# Patient Record
Sex: MALE | Race: WHITE | Employment: UNEMPLOYED | URBAN - METROPOLITAN AREA
[De-identification: names, ages, dates, MRNs, and addresses within clinical notes are randomized per-mention and may not be internally consistent; named-entity substitution may affect disease eponyms.]

---

## 2020-02-24 ENCOUNTER — APPOINTMENT (OUTPATIENT)
Dept: GENERAL RADIOLOGY | Age: 45
End: 2020-02-24
Payer: COMMERCIAL

## 2020-02-24 ENCOUNTER — HOSPITAL ENCOUNTER (EMERGENCY)
Age: 45
Discharge: HOME | End: 2020-02-24
Payer: COMMERCIAL

## 2020-02-24 VITALS
WEIGHT: 194.22 LBS | RESPIRATION RATE: 16 BRPM | TEMPERATURE: 98.2 F | SYSTOLIC BLOOD PRESSURE: 173 MMHG | DIASTOLIC BLOOD PRESSURE: 95 MMHG | HEART RATE: 73 BPM | OXYGEN SATURATION: 99 %

## 2020-02-24 PROCEDURE — 29125 APPL SHORT ARM SPLINT STATIC: CPT

## 2020-02-24 PROCEDURE — 6370000000 HC RX 637 (ALT 250 FOR IP): Performed by: PHYSICIAN ASSISTANT

## 2020-02-24 PROCEDURE — 99283 EMERGENCY DEPT VISIT LOW MDM: CPT

## 2020-02-24 PROCEDURE — 73110 X-RAY EXAM OF WRIST: CPT

## 2020-02-24 RX ORDER — OXYCODONE HYDROCHLORIDE AND ACETAMINOPHEN 5; 325 MG/1; MG/1
1 TABLET ORAL EVERY 8 HOURS PRN
Qty: 10 TABLET | Refills: 0 | Status: SHIPPED | OUTPATIENT
Start: 2020-02-24 | End: 2020-02-29

## 2020-02-24 RX ORDER — IBUPROFEN 800 MG/1
800 TABLET ORAL EVERY 6 HOURS PRN
Qty: 30 TABLET | Refills: 0 | Status: SHIPPED | OUTPATIENT
Start: 2020-02-24 | End: 2021-07-15 | Stop reason: ALTCHOICE

## 2020-02-24 RX ORDER — OXYCODONE HYDROCHLORIDE AND ACETAMINOPHEN 5; 325 MG/1; MG/1
2 TABLET ORAL ONCE
Status: COMPLETED | OUTPATIENT
Start: 2020-02-24 | End: 2020-02-24

## 2020-02-24 RX ADMIN — OXYCODONE HYDROCHLORIDE AND ACETAMINOPHEN 2 TABLET: 5; 325 TABLET ORAL at 16:30

## 2020-02-24 SDOH — HEALTH STABILITY: MENTAL HEALTH: HOW OFTEN DO YOU HAVE A DRINK CONTAINING ALCOHOL?: NEVER

## 2020-02-24 ASSESSMENT — PAIN DESCRIPTION - PROGRESSION: CLINICAL_PROGRESSION: NOT CHANGED

## 2020-02-24 ASSESSMENT — PAIN SCALES - GENERAL
PAINLEVEL_OUTOF10: 5

## 2020-02-24 ASSESSMENT — PAIN DESCRIPTION - DESCRIPTORS: DESCRIPTORS: ACHING

## 2020-02-24 ASSESSMENT — PAIN DESCRIPTION - PAIN TYPE: TYPE: ACUTE PAIN

## 2020-02-24 ASSESSMENT — PAIN DESCRIPTION - LOCATION: LOCATION: WRIST

## 2020-02-24 ASSESSMENT — PAIN - FUNCTIONAL ASSESSMENT
PAIN_FUNCTIONAL_ASSESSMENT: PREVENTS OR INTERFERES SOME ACTIVE ACTIVITIES AND ADLS
PAIN_FUNCTIONAL_ASSESSMENT: 0-10

## 2020-02-24 ASSESSMENT — PAIN DESCRIPTION - ORIENTATION: ORIENTATION: RIGHT

## 2020-02-24 ASSESSMENT — PAIN DESCRIPTION - FREQUENCY: FREQUENCY: INTERMITTENT

## 2020-02-24 ASSESSMENT — PAIN DESCRIPTION - ONSET: ONSET: ON-GOING

## 2020-02-24 NOTE — LETTER
[address] [facility]  [address] [address]  Phone: [phone]               February 24, 2020    Patient:    Date of Birth:    Date of Visit:        To Whom It May Concern:    Vaibhav Wayalgo was present  in our emergency department on 2/24/2020.      Sincerely,       Catherine Oliver RN         Signature:__________________________________

## 2020-02-24 NOTE — ED PROVIDER NOTES
[address]  [address] [address]  Dept: [phone]  Loc: [address] ENCOUNTER        This patient was not seen or evaluated by the attending physician. I evaluated this patient, the attending physician was available for consultation. CHIEF COMPLAINT    Chief Complaint   Patient presents with    Wrist Pain     right wrist pain       HPI    Nikkie Mott is a 38 y.o. male who presents with right wrist pain. The onset was last night. The duration has been constant since the onset. The quality of the pain is sharp and the severity is 5/10. The patient has no other associated injury. The context is that the patient was \"playing around\" last night and tripped over the edge of the bed and fell backwards, catching himself with his right hand. He states he had some pain and swelling at the time but it was worse this morning so he came to the ED. REVIEW OF SYSTEMS    Skin: No lacerations or puncture wounds  Musculoskeletal: see HPI, no other joint or bony injury or pain  Neurologic: No loss of consciousness, no head injury, no paresthesias or focal distal extremity weakness    PAST MEDICAL & SURGICAL HISTORY    History reviewed. No pertinent past medical history. History reviewed. No pertinent surgical history. CURRENT MEDICATIONS  (may include discharge medications prescribed in the ED)  Current Outpatient Rx   Medication Sig Dispense Refill    oxyCODONE-acetaminophen (PERCOCET) 5-325 MG per tablet One tab twice a day for one week, then one tab once a day for one week then stop.  21 tablet 0       ALLERGIES    Allergies   Allergen Reactions    Hydrocodone        SOCIAL & FAMILY HISTORY    Social History     Socioeconomic History    Marital status: Single     Spouse name: None    Number of children: None    Years of education: None    Highest education level: None   Occupational History  None   Social Needs    Financial resource strain: None    Food insecurity:     Worry: None     Inability: None    Transportation needs:     Medical: None     Non-medical: None   Tobacco Use    Smoking status: Former Smoker    Smokeless tobacco: Never Used   Substance and Sexual Activity    Alcohol use: Never     Frequency: Never    Drug use: Never    Sexual activity: None   Lifestyle    Physical activity:     Days per week: None     Minutes per session: None    Stress: None   Relationships    Social connections:     Talks on phone: None     Gets together: None     Attends Mu-ism service: None     Active member of club or organization: None     Attends meetings of clubs or organizations: None     Relationship status: None    Intimate partner violence:     Fear of current or ex partner: None     Emotionally abused: None     Physically abused: None     Forced sexual activity: None   Other Topics Concern    None   Social History Narrative    None     History reviewed. No pertinent family history.       PHYSICAL EXAM    VITAL SIGNS: BP (!) 173/95   Pulse 73   Temp 98.2 °F (36.8 °C) (Oral)   Resp 16   Wt 194 lb 3.6 oz (88.1 kg)   SpO2 99%   Constitutional:  Well nourished, no acute distress  HENT:  Atraumatic, moist mucous membranes  NECK: normal range of motion,  supple   Respiratory:  No respiratory distress  Cardiovascular:  No JVD  Vascular: right radial pulse 2+, capillary refill less than 2 seconds  Musculoskeletal:  +exquisite tenderness to palpation and edema of the wrist, point tenderness over the distal radius, edema extends up to the middle of the forearm, no obvious deformity  Integument:  Well hydrated, no skin lacerations  Neurologic:  Awake alert, no slurred speech, sensory and motor intact    RADIOLOGY   XR WRIST RIGHT (MIN 3 VIEWS)   Final Result   Acute fracture of the distal radius with involvement of the articular surface             PROCEDURES  SPLINT PLACEMENT  A volar OCL splint was applied to the affected limb by the emergency department technician. I evaluated the splint after it was applied. The splint was in good position. The patient's extremity was neurovascularly intact after the splint placement. ED COURSE & MEDICAL DECISION MAKING   See chart for medications given during emergency department course. Differential diagnosis: includes but not limited to Arterial Injury/Ischemia, Fracture, Dislocation, Infection, Compartment Syndrome, Neurologic Deficit/Injury. No evidence of neurovascular injury on exam.  Plain films as above, distal radius fracture. Consultation with orthopedics at 4:20 PM: I spoke with Isma Gonzalez CNP for Dr. Cabrera Braden, she said the patient can follow up in the clinic with Dr. Cabrera Braden in [place] tomorrow at 1 PM.    The patient was instructed to follow up as an outpatient in 1 day. The patient was instructed to return to the ED immediately for any new or worsening symptoms. The patient verbalized understanding. FINAL IMPRESSION    1.  Other closed fracture of distal end of right radius, initial encounter        PLAN  Discharge with outpatient follow-up and discharge instructions (see EMR)    (Please note that this note was completed with a voice recognition program.  Every attempt was made to edit the dictations, but inevitably there remain words that are mis-transcribed.)          Jaime Turner [facility]  02/24/20 9001

## 2020-02-25 ENCOUNTER — OFFICE VISIT (OUTPATIENT)
Dept: ORTHOPEDIC SURGERY | Age: 45
End: 2020-02-25
Payer: COMMERCIAL

## 2020-02-25 VITALS — RESPIRATION RATE: 16 BRPM | HEIGHT: 70 IN | WEIGHT: 194 LBS | BODY MASS INDEX: 27.77 KG/M2

## 2020-02-25 PROBLEM — S52.501A CLOSED FRACTURE OF RIGHT DISTAL RADIUS: Status: ACTIVE | Noted: 2020-02-25

## 2020-02-25 PROCEDURE — 99203 OFFICE O/P NEW LOW 30 MIN: CPT | Performed by: ORTHOPAEDIC SURGERY

## 2020-02-25 PROCEDURE — G8484 FLU IMMUNIZE NO ADMIN: HCPCS | Performed by: ORTHOPAEDIC SURGERY

## 2020-02-25 PROCEDURE — G8427 DOCREV CUR MEDS BY ELIG CLIN: HCPCS | Performed by: ORTHOPAEDIC SURGERY

## 2020-02-25 PROCEDURE — G8419 CALC BMI OUT NRM PARAM NOF/U: HCPCS | Performed by: ORTHOPAEDIC SURGERY

## 2020-02-25 PROCEDURE — 1036F TOBACCO NON-USER: CPT | Performed by: ORTHOPAEDIC SURGERY

## 2020-02-25 RX ORDER — OXYCODONE HYDROCHLORIDE AND ACETAMINOPHEN 5; 325 MG/1; MG/1
1 TABLET ORAL EVERY 8 HOURS PRN
Qty: 15 TABLET | Refills: 0 | Status: SHIPPED | OUTPATIENT
Start: 2020-02-25 | End: 2020-03-01

## 2020-02-25 RX ORDER — CEPHALEXIN 500 MG/1
500 CAPSULE ORAL 4 TIMES DAILY
Qty: 20 CAPSULE | Refills: 0 | Status: SHIPPED | OUTPATIENT
Start: 2020-02-25 | End: 2021-07-15 | Stop reason: ALTCHOICE

## 2020-02-25 NOTE — LETTER
[facility]  [address] [place] [address]  Phone: [phone]  Fax: [phone]    Armando Garcia MD        February 25, 2020     Patient: Gaby Whitlock   YOB: 1975   Date of Visit: 2/25/2020       To Whom it May Concern:    Please excuse Lizzie Peace from work on 02/25/2020-02/27/2020 for she is accompanying her  for his orthopedic surgery. If you have any questions or concerns, please don't hesitate to call.     Sincerely,     Armando Garcia MD

## 2020-02-25 NOTE — PROGRESS NOTES
CHIEF COMPLAINT: Right wrist pain/ minimally displaced impacted distal radius intra-articular fracture. DATE OF INJURY: 2/23/2020    HISTORY:  Mr. Romeo Ocampo is a 38 y.o.  male right handed who presents today for evaluation of a right wrist injury. The patient reports that this injury occurred when he was playing around and fell backwards trying to catch himself on the bed but landed on the floor. He was first seen and evaluated in [facility] ER, when he was x-rayed and splinted, and asked to f/u with Orthopedics. The patient denies any other injuries. Rates pain a 8/10 VAS severe, sharp, aching, constant and show no change. Movement makes the pain worse, the splint and resting makes the pain better. Alleviating factors ice, elevation and rest. No numbness or tingling sensation. He works in a warehouse doing carpet and hanny, physical labor. He is a smoker. History reviewed. No pertinent past medical history. History reviewed. No pertinent surgical history.     Social History     Socioeconomic History    Marital status: Single     Spouse name: Not on file    Number of children: Not on file    Years of education: Not on file    Highest education level: Not on file   Occupational History    Not on file   Social Needs    Financial resource strain: Not on file    Food insecurity:     Worry: Not on file     Inability: Not on file    Transportation needs:     Medical: Not on file     Non-medical: Not on file   Tobacco Use    Smoking status: Former Smoker    Smokeless tobacco: Never Used   Substance and Sexual Activity    Alcohol use: Never     Frequency: Never    Drug use: Never    Sexual activity: Not on file   Lifestyle    Physical activity:     Days per week: Not on file     Minutes per session: Not on file    Stress: Not on file   Relationships    Social connections:     Talks on phone: Not on file     Gets together: Not on file     Attends Sikh service: Not on file     Active member of club or organization: Not on file     Attends meetings of clubs or organizations: Not on file     Relationship status: Not on file    Intimate partner violence:     Fear of current or ex partner: Not on file     Emotionally abused: Not on file     Physically abused: Not on file     Forced sexual activity: Not on file   Other Topics Concern    Not on file   Social History Narrative    Not on file       History reviewed. No pertinent family history. Current Outpatient Medications on File Prior to Visit   Medication Sig Dispense Refill    oxyCODONE-acetaminophen (PERCOCET) 5-325 MG per tablet Take 1 tablet by mouth every 8 hours as needed for Pain for up to 5 days. WARNING:  May cause drowsiness. May impair ability to operate vehicles or machinery. Do not use in combination with alcohol. 10 tablet 0    ibuprofen (ADVIL;MOTRIN) 800 MG tablet Take 1 tablet by mouth every 6 hours as needed for Pain 30 tablet 0     No current facility-administered medications on file prior to visit. Pertinent items are noted in HPI  Review of systems reviewed from Patient History Form dated on 2/25/2020 and available in the patient's chart under the Media tab. PHYSICAL EXAMINATION:  Mr. Alonzo Briones is a very pleasant 38 y.o.  male who presents today in no acute distress, awake, alert, and oriented. He is well dressed, nourished and  groomed. Patient with normal affect. Height is  5' 10\" (1.778 m), weight is 194 lb (88 kg), Body mass index is 27.84 kg/m². Resting respiratory rate is 16. On evaluation of his right upper extremity, there is no deformity. There is moderate swelling and moderate ecchymosis. He is tender to palpation over the distal radius, and otherwise nontender over the remainder of the extremity. Range of motion is decreased secondary to pain over the right wrist, but no mechanical block.   The skin overlying the right wrist is intact without evidence of lesion, laceration or

## 2020-02-26 NOTE — PROGRESS NOTES
Name_______________________________________Printed:____________________  Date and time of surgery________2/27/20 0700________________Arrival Time:_____[facility] [place]___   1. The instructions given regarding when and if a patient needs to stop oral intake prior to surgery varies. Follow the specific instructions you were given                  __x_Nothing to eat or to drink after Midnight the night before.                             ____Endoscopy patient follow your DRS instructions-generally you will be doing a part of the prep after Midnight                   ____Carbo loading or ERAS instructions will be given to select patients-if you have been given those instructions -please do the following                           The evening before your surgery after dinner before midnight drink 40 ounces of gatorade. If you are diabetic use sugar free. The morning of surgery drink 40 ounces of water. This needs to be finished 3 hours prior to your surgery start time. 2. Take the following pills with a small sip of water on the morning of surgery___________________________________________________                  Do not take blood pressure medications ending in pril or sartan the domonique prior to surgery or the morning of surgery_   3. Aspirin, Ibuprofen, Advil, Naproxen, Vitamin E and other Anti-inflammatory products and supplements should be stopped for 5 -7days before surgery or as directed by your physician. 4. Check with your Doctor regarding stopping Plavix, Coumadin,Eliquis, Lovenox,Effient,Pradaxa,Xarelto, Fragmin or other blood thinners and follow their instructions. 5. Do not smoke, and do not drink any alcoholic beverages 24 hours prior to surgery. This includes NA Beer. Refrain from the usage of any recreational drugs. 6. You may brush your teeth and gargle the morning of surgery. DO NOT SWALLOW WATER   7.  You MUST make arrangements for a responsible adult to stay on site while you are here and take you home after your surgery. You will not be allowed to leave alone or drive yourself home. It is strongly suggested someone stay with you the first 24 hrs. Your surgery will be cancelled if you do not have a ride home. 8. A parent/legal guardian must accompany a child scheduled for surgery and plan to stay at the hospital until the child is discharged. Please do not bring other children with you. 9. Please wear simple, loose fitting clothing to the hospital.  Aaliyah Crofts not bring valuables (money, credit cards, checkbooks, etc.) Do not wear any makeup (including no eye makeup) or nail polish on your fingers or toes. 10. DO NOT wear any jewelry or piercings on day of surgery. All body piercing jewelry must be removed. 11. If you have ___dentures, they will be removed before going to the OR; we will provide you a container. If you wear ___contact lenses or ___glasses, they will be removed; please bring a case for them. 12. Please see your family doctor/pediatrician for a history & physical and/or concerning medications. Bring any test results/reports from your physician's office. PCP________x__________Phone___________H&P Appt. Date________             13 If you  have a Living Will and Durable Power of  for Healthcare, please bring in a copy. 15. Notify your Surgeon if you develop any illness between now and surgery  time, cough, cold, fever, sore throat, nausea, vomiting, etc.  Please notify your surgeon if you experience dizziness, shortness of breath or blurred vision between now & the time of your surgery             15. DO NOT shave your operative site 96 hours prior to surgery. For face & neck surgery, men may use an electric razor 48 hours prior to surgery. 16. Shower the night before or morning of surgery using an antibacterial soap or as you have been instructed.              17. To provide excellent care visitors will be limited to one in the

## 2020-02-27 ENCOUNTER — APPOINTMENT (OUTPATIENT)
Dept: GENERAL RADIOLOGY | Age: 45
End: 2020-02-27
Attending: ORTHOPAEDIC SURGERY
Payer: COMMERCIAL

## 2020-02-27 ENCOUNTER — ANESTHESIA EVENT (OUTPATIENT)
Dept: OPERATING ROOM | Age: 45
End: 2020-02-27
Payer: COMMERCIAL

## 2020-02-27 ENCOUNTER — HOSPITAL ENCOUNTER (OUTPATIENT)
Age: 45
Setting detail: OUTPATIENT SURGERY
Discharge: HOME | End: 2020-02-27
Attending: ORTHOPAEDIC SURGERY | Admitting: ORTHOPAEDIC SURGERY
Payer: COMMERCIAL

## 2020-02-27 ENCOUNTER — ANESTHESIA (OUTPATIENT)
Dept: OPERATING ROOM | Age: 45
End: 2020-02-27
Payer: COMMERCIAL

## 2020-02-27 VITALS
OXYGEN SATURATION: 97 % | TEMPERATURE: 98.3 F | WEIGHT: 190 LBS | BODY MASS INDEX: 27.2 KG/M2 | RESPIRATION RATE: 16 BRPM | HEIGHT: 70 IN | DIASTOLIC BLOOD PRESSURE: 88 MMHG | SYSTOLIC BLOOD PRESSURE: 144 MMHG | HEART RATE: 62 BPM

## 2020-02-27 VITALS
SYSTOLIC BLOOD PRESSURE: 129 MMHG | DIASTOLIC BLOOD PRESSURE: 83 MMHG | OXYGEN SATURATION: 99 % | RESPIRATION RATE: 3 BRPM

## 2020-02-27 PROCEDURE — 7100000001 HC PACU RECOVERY - ADDTL 15 MIN: Performed by: ORTHOPAEDIC SURGERY

## 2020-02-27 PROCEDURE — 2580000003 HC RX 258: Performed by: ORTHOPAEDIC SURGERY

## 2020-02-27 PROCEDURE — 2709999900 HC NON-CHARGEABLE SUPPLY: Performed by: ORTHOPAEDIC SURGERY

## 2020-02-27 PROCEDURE — 7100000010 HC PHASE II RECOVERY - FIRST 15 MIN: Performed by: ORTHOPAEDIC SURGERY

## 2020-02-27 PROCEDURE — 3700000000 HC ANESTHESIA ATTENDED CARE: Performed by: ORTHOPAEDIC SURGERY

## 2020-02-27 PROCEDURE — 3600000014 HC SURGERY LEVEL 4 ADDTL 15MIN: Performed by: ORTHOPAEDIC SURGERY

## 2020-02-27 PROCEDURE — 3209999900 FLUORO FOR SURGICAL PROCEDURES

## 2020-02-27 PROCEDURE — 25609 OPTX DST RD XART FX/EP SEP3+: CPT | Performed by: NURSE PRACTITIONER

## 2020-02-27 PROCEDURE — 6360000002 HC RX W HCPCS: Performed by: NURSE ANESTHETIST, CERTIFIED REGISTERED

## 2020-02-27 PROCEDURE — 6360000002 HC RX W HCPCS: Performed by: ORTHOPAEDIC SURGERY

## 2020-02-27 PROCEDURE — 6370000000 HC RX 637 (ALT 250 FOR IP): Performed by: ANESTHESIOLOGY

## 2020-02-27 PROCEDURE — C1776 JOINT DEVICE (IMPLANTABLE): HCPCS | Performed by: ORTHOPAEDIC SURGERY

## 2020-02-27 PROCEDURE — 7100000011 HC PHASE II RECOVERY - ADDTL 15 MIN: Performed by: ORTHOPAEDIC SURGERY

## 2020-02-27 PROCEDURE — 3700000001 HC ADD 15 MINUTES (ANESTHESIA): Performed by: ORTHOPAEDIC SURGERY

## 2020-02-27 PROCEDURE — 6360000002 HC RX W HCPCS: Performed by: ANESTHESIOLOGY

## 2020-02-27 PROCEDURE — 2500000003 HC RX 250 WO HCPCS: Performed by: ORTHOPAEDIC SURGERY

## 2020-02-27 PROCEDURE — 2720000010 HC SURG SUPPLY STERILE: Performed by: ORTHOPAEDIC SURGERY

## 2020-02-27 PROCEDURE — 25609 OPTX DST RD XART FX/EP SEP3+: CPT | Performed by: ORTHOPAEDIC SURGERY

## 2020-02-27 PROCEDURE — 7100000000 HC PACU RECOVERY - FIRST 15 MIN: Performed by: ORTHOPAEDIC SURGERY

## 2020-02-27 PROCEDURE — 3600000004 HC SURGERY LEVEL 4 BASE: Performed by: ORTHOPAEDIC SURGERY

## 2020-02-27 PROCEDURE — 73100 X-RAY EXAM OF WRIST: CPT

## 2020-02-27 PROCEDURE — C1713 ANCHOR/SCREW BN/BN,TIS/BN: HCPCS | Performed by: ORTHOPAEDIC SURGERY

## 2020-02-27 PROCEDURE — 2500000003 HC RX 250 WO HCPCS: Performed by: NURSE ANESTHETIST, CERTIFIED REGISTERED

## 2020-02-27 DEVICE — LOCKING SCREW, FULLY THREADED,T8
Type: IMPLANTABLE DEVICE | Site: WRIST | Status: FUNCTIONAL
Brand: VARIAX

## 2020-02-27 DEVICE — IMPLANTABLE DEVICE: Type: IMPLANTABLE DEVICE | Site: WRIST | Status: FUNCTIONAL

## 2020-02-27 DEVICE — VOLAR DR PLATE NARROW RIGHT EXTRASHORT
Type: IMPLANTABLE DEVICE | Site: WRIST | Status: FUNCTIONAL
Brand: VARIAX

## 2020-02-27 DEVICE — BONE SCREW, FULLY THREADED, T8
Type: IMPLANTABLE DEVICE | Site: WRIST | Status: FUNCTIONAL
Brand: VARIAX

## 2020-02-27 RX ORDER — SODIUM CHLORIDE 0.9 % (FLUSH) 0.9 %
10 SYRINGE (ML) INJECTION EVERY 12 HOURS SCHEDULED
Status: DISCONTINUED | OUTPATIENT
Start: 2020-02-27 | End: 2020-02-27 | Stop reason: HOSPADM

## 2020-02-27 RX ORDER — PROMETHAZINE HYDROCHLORIDE 25 MG/ML
6.25 INJECTION, SOLUTION INTRAMUSCULAR; INTRAVENOUS
Status: DISCONTINUED | OUTPATIENT
Start: 2020-02-27 | End: 2020-02-27 | Stop reason: HOSPADM

## 2020-02-27 RX ORDER — ONDANSETRON 2 MG/ML
INJECTION INTRAMUSCULAR; INTRAVENOUS PRN
Status: DISCONTINUED | OUTPATIENT
Start: 2020-02-27 | End: 2020-02-27 | Stop reason: SDUPTHER

## 2020-02-27 RX ORDER — PROPOFOL 10 MG/ML
INJECTION, EMULSION INTRAVENOUS PRN
Status: DISCONTINUED | OUTPATIENT
Start: 2020-02-27 | End: 2020-02-27 | Stop reason: SDUPTHER

## 2020-02-27 RX ORDER — PHENYLEPHRINE HCL IN 0.9% NACL 1 MG/10 ML
SYRINGE (ML) INTRAVENOUS PRN
Status: DISCONTINUED | OUTPATIENT
Start: 2020-02-27 | End: 2020-02-27 | Stop reason: SDUPTHER

## 2020-02-27 RX ORDER — HYDROMORPHONE HCL 110MG/55ML
0.5 PATIENT CONTROLLED ANALGESIA SYRINGE INTRAVENOUS EVERY 5 MIN PRN
Status: DISCONTINUED | OUTPATIENT
Start: 2020-02-27 | End: 2020-02-27 | Stop reason: HOSPADM

## 2020-02-27 RX ORDER — LABETALOL HYDROCHLORIDE 5 MG/ML
INJECTION, SOLUTION INTRAVENOUS PRN
Status: DISCONTINUED | OUTPATIENT
Start: 2020-02-27 | End: 2020-02-27 | Stop reason: SDUPTHER

## 2020-02-27 RX ORDER — MIDAZOLAM HYDROCHLORIDE 1 MG/ML
INJECTION INTRAMUSCULAR; INTRAVENOUS PRN
Status: DISCONTINUED | OUTPATIENT
Start: 2020-02-27 | End: 2020-02-27 | Stop reason: SDUPTHER

## 2020-02-27 RX ORDER — OXYCODONE HYDROCHLORIDE AND ACETAMINOPHEN 5; 325 MG/1; MG/1
1 TABLET ORAL ONCE
Status: COMPLETED | OUTPATIENT
Start: 2020-02-27 | End: 2020-02-27

## 2020-02-27 RX ORDER — FENTANYL CITRATE 50 UG/ML
25 INJECTION, SOLUTION INTRAMUSCULAR; INTRAVENOUS EVERY 5 MIN PRN
Status: DISCONTINUED | OUTPATIENT
Start: 2020-02-27 | End: 2020-02-27 | Stop reason: HOSPADM

## 2020-02-27 RX ORDER — LABETALOL HYDROCHLORIDE 5 MG/ML
5 INJECTION, SOLUTION INTRAVENOUS EVERY 10 MIN PRN
Status: DISCONTINUED | OUTPATIENT
Start: 2020-02-27 | End: 2020-02-27 | Stop reason: HOSPADM

## 2020-02-27 RX ORDER — BUPIVACAINE HYDROCHLORIDE 5 MG/ML
INJECTION, SOLUTION EPIDURAL; INTRACAUDAL
Status: COMPLETED | OUTPATIENT
Start: 2020-02-27 | End: 2020-02-27

## 2020-02-27 RX ORDER — DEXAMETHASONE SODIUM PHOSPHATE 4 MG/ML
INJECTION, SOLUTION INTRA-ARTICULAR; INTRALESIONAL; INTRAMUSCULAR; INTRAVENOUS; SOFT TISSUE PRN
Status: DISCONTINUED | OUTPATIENT
Start: 2020-02-27 | End: 2020-02-27 | Stop reason: SDUPTHER

## 2020-02-27 RX ORDER — SODIUM CHLORIDE 0.9 % (FLUSH) 0.9 %
10 SYRINGE (ML) INJECTION PRN
Status: DISCONTINUED | OUTPATIENT
Start: 2020-02-27 | End: 2020-02-27 | Stop reason: HOSPADM

## 2020-02-27 RX ORDER — SODIUM CHLORIDE, SODIUM LACTATE, POTASSIUM CHLORIDE, CALCIUM CHLORIDE 600; 310; 30; 20 MG/100ML; MG/100ML; MG/100ML; MG/100ML
INJECTION, SOLUTION INTRAVENOUS CONTINUOUS
Status: DISCONTINUED | OUTPATIENT
Start: 2020-02-27 | End: 2020-02-27 | Stop reason: HOSPADM

## 2020-02-27 RX ORDER — LIDOCAINE HYDROCHLORIDE 20 MG/ML
INJECTION, SOLUTION EPIDURAL; INFILTRATION; INTRACAUDAL; PERINEURAL PRN
Status: DISCONTINUED | OUTPATIENT
Start: 2020-02-27 | End: 2020-02-27 | Stop reason: SDUPTHER

## 2020-02-27 RX ORDER — SODIUM CHLORIDE 9 MG/ML
INJECTION, SOLUTION INTRAVENOUS CONTINUOUS
Status: DISCONTINUED | OUTPATIENT
Start: 2020-02-27 | End: 2020-02-27 | Stop reason: HOSPADM

## 2020-02-27 RX ORDER — FENTANYL CITRATE 50 UG/ML
INJECTION, SOLUTION INTRAMUSCULAR; INTRAVENOUS PRN
Status: DISCONTINUED | OUTPATIENT
Start: 2020-02-27 | End: 2020-02-27 | Stop reason: SDUPTHER

## 2020-02-27 RX ADMIN — PROPOFOL 300 MG: 10 INJECTION, EMULSION INTRAVENOUS at 07:04

## 2020-02-27 RX ADMIN — FENTANYL CITRATE 25 MCG: 50 INJECTION, SOLUTION INTRAMUSCULAR; INTRAVENOUS at 07:17

## 2020-02-27 RX ADMIN — CEFAZOLIN 2 G: 10 INJECTION, POWDER, FOR SOLUTION INTRAVENOUS at 06:56

## 2020-02-27 RX ADMIN — LABETALOL HYDROCHLORIDE 5 MG: 5 INJECTION, SOLUTION INTRAVENOUS at 07:51

## 2020-02-27 RX ADMIN — HYDROMORPHONE HYDROCHLORIDE 0.5 MG: 2 INJECTION, SOLUTION INTRAMUSCULAR; INTRAVENOUS; SUBCUTANEOUS at 08:40

## 2020-02-27 RX ADMIN — Medication 100 MCG: at 07:10

## 2020-02-27 RX ADMIN — DEXAMETHASONE SODIUM PHOSPHATE 8 MG: 4 INJECTION, SOLUTION INTRAMUSCULAR; INTRAVENOUS at 07:13

## 2020-02-27 RX ADMIN — SODIUM CHLORIDE, POTASSIUM CHLORIDE, SODIUM LACTATE AND CALCIUM CHLORIDE: 600; 310; 30; 20 INJECTION, SOLUTION INTRAVENOUS at 07:00

## 2020-02-27 RX ADMIN — SODIUM CHLORIDE, POTASSIUM CHLORIDE, SODIUM LACTATE AND CALCIUM CHLORIDE: 600; 310; 30; 20 INJECTION, SOLUTION INTRAVENOUS at 06:30

## 2020-02-27 RX ADMIN — OXYCODONE HYDROCHLORIDE AND ACETAMINOPHEN 1 TABLET: 5; 325 TABLET ORAL at 09:02

## 2020-02-27 RX ADMIN — ONDANSETRON 4 MG: 2 INJECTION INTRAMUSCULAR; INTRAVENOUS at 07:13

## 2020-02-27 RX ADMIN — HYDROMORPHONE HYDROCHLORIDE 0.5 MG: 2 INJECTION, SOLUTION INTRAMUSCULAR; INTRAVENOUS; SUBCUTANEOUS at 08:32

## 2020-02-27 RX ADMIN — LIDOCAINE HYDROCHLORIDE 100 MG: 20 INJECTION, SOLUTION EPIDURAL; INFILTRATION; INTRACAUDAL; PERINEURAL at 07:04

## 2020-02-27 RX ADMIN — MIDAZOLAM 2 MG: 1 INJECTION INTRAMUSCULAR; INTRAVENOUS at 07:00

## 2020-02-27 RX ADMIN — HYDROMORPHONE HYDROCHLORIDE 0.5 MG: 2 INJECTION, SOLUTION INTRAMUSCULAR; INTRAVENOUS; SUBCUTANEOUS at 08:52

## 2020-02-27 ASSESSMENT — PULMONARY FUNCTION TESTS
PIF_VALUE: 8
PIF_VALUE: 7
PIF_VALUE: 7
PIF_VALUE: 6
PIF_VALUE: 6
PIF_VALUE: 8
PIF_VALUE: 6
PIF_VALUE: 7
PIF_VALUE: 6
PIF_VALUE: 7
PIF_VALUE: 8
PIF_VALUE: 7
PIF_VALUE: 6
PIF_VALUE: 1
PIF_VALUE: 4
PIF_VALUE: 1
PIF_VALUE: 8
PIF_VALUE: 7
PIF_VALUE: 7
PIF_VALUE: 6
PIF_VALUE: 13
PIF_VALUE: 6
PIF_VALUE: 19
PIF_VALUE: 11
PIF_VALUE: 6
PIF_VALUE: 0
PIF_VALUE: 7
PIF_VALUE: 7
PIF_VALUE: 4
PIF_VALUE: 6
PIF_VALUE: 13
PIF_VALUE: 7
PIF_VALUE: 6
PIF_VALUE: 7
PIF_VALUE: 6
PIF_VALUE: 6
PIF_VALUE: 19
PIF_VALUE: 7
PIF_VALUE: 6
PIF_VALUE: 8
PIF_VALUE: 18
PIF_VALUE: 6
PIF_VALUE: 2
PIF_VALUE: 8
PIF_VALUE: 6
PIF_VALUE: 8
PIF_VALUE: 6
PIF_VALUE: 7
PIF_VALUE: 6
PIF_VALUE: 7
PIF_VALUE: 0
PIF_VALUE: 7
PIF_VALUE: 6
PIF_VALUE: 18
PIF_VALUE: 1
PIF_VALUE: 9
PIF_VALUE: 6

## 2020-02-27 ASSESSMENT — PAIN DESCRIPTION - ONSET: ONSET: ON-GOING

## 2020-02-27 ASSESSMENT — PAIN DESCRIPTION - DESCRIPTORS
DESCRIPTORS: ACHING
DESCRIPTORS: DISCOMFORT;SHARP

## 2020-02-27 ASSESSMENT — PAIN SCALES - GENERAL
PAINLEVEL_OUTOF10: 6
PAINLEVEL_OUTOF10: 7
PAINLEVEL_OUTOF10: 8
PAINLEVEL_OUTOF10: 7

## 2020-02-27 ASSESSMENT — PAIN DESCRIPTION - FREQUENCY: FREQUENCY: CONTINUOUS

## 2020-02-27 ASSESSMENT — PAIN DESCRIPTION - ORIENTATION: ORIENTATION: RIGHT

## 2020-02-27 ASSESSMENT — PAIN DESCRIPTION - PROGRESSION: CLINICAL_PROGRESSION: GRADUALLY WORSENING

## 2020-02-27 ASSESSMENT — PAIN DESCRIPTION - PAIN TYPE: TYPE: SURGICAL PAIN

## 2020-02-27 ASSESSMENT — PAIN DESCRIPTION - LOCATION: LOCATION: ARM

## 2020-02-27 ASSESSMENT — PAIN - FUNCTIONAL ASSESSMENT: PAIN_FUNCTIONAL_ASSESSMENT: 0-10

## 2020-02-27 NOTE — OP NOTE
[facility]                     [facility], [address]                                OPERATIVE REPORT    PATIENT NAME: Kristi Rdz                        :        1975  MED REC NO:   [id number]                          ROOM:  ACCOUNT NO:   [de-identified]                           ADMIT DATE: 2020  PROVIDER:     Florinda Ariza MD    DATE OF PROCEDURE:  2020    PRIMARY CARE PHYSICIAN:  Padmini Soler MD    PREOPERATIVE DIAGNOSES:  Right distal radius intraarticular three-part  fracture including a die-punch fracture. POSTOPERATIVE DIAGNOSES:  Right distal radius intraarticular three-part  fracture including a die-punch fracture. OPERATION PERFORMED:  Open treatment of right distal radius three-part  fracture including die-punch with open reduction and internal fixation  with bone graft. SURGEON:  Florinda Ariza MD    ASSISTANT:  Costa Redding CNP    ANESTHESIA:  General anesthesia. ESTIMATED BLOOD LOSS:  Minimal.    COMPLICATIONS:  None. TOURNIQUET:  Right upper arm 250 mmHg. IMPLANT USED:  1.  [place] Titanium intermediate volar locking plate with total of  seven distal 2.7 locking screws and two proximal screws. 2.  15 mL cancellous chips allograft. INDICATION:  This is a 81-year-old white male right-hand dominant who  sustained an injury to his right wrist when he was playing around and he  fell backwards with injury to his right wrist.  All risks, benefits and  alternatives were discussed with the patient. He agreed to proceed with  surgical treatment. Given the patient's Body mass index is 27.26 kg/m². die-punch intraarticular fracture added significant challenge to the procedure. It required significant physical and mental effort. It required 80% more time for such procedure. DESCRIPTION OF PROCEDURE:  The patient's right wrist was marked. He  received 2 gm Ancef IV preoperatively.   The patient was then brought hemostasis was secured. We irrigated the incision copiously  with normal saline that was mixed with gentamicin. We closed the subcu  with a 3-0 Vicryl and the skin with a 4-0 Monocryl. Steri-Strips were  then applied. Dressing was then applied in the form of Xeroform, 4 x 4,  sterile Webril and a volar splint was applied. The patient tolerated the procedure well and was taken to the recovery  in stable condition. Yodit Saldivar CNP was 1st Assist given the nature of the procedure that needed advanced assistance. POSTOPERATIVE PLAN:  The patient will be discharged home. He will be  nonweightbearing on the right wrist, but he can start range of motion in  two weeks.         Teresa Diaz MD    D: 02/27/2020 13:14:13       T: 02/27/2020 16:35:07     [id number]  Job#: [id number]     Doc#: [id number]    CC:  Selene De La Rosa MD

## 2020-02-27 NOTE — PROGRESS NOTES
Pt awake and alert. Pt on RA, VSS. Wife at bedside. Pt with c/o pain (see MAR), denies nausea, tolerating PO. Skin warm RUE, palpable pulses and able to wiggle fingers. Pt meets criteria to be discharged from phase 1.

## 2020-02-27 NOTE — BRIEF OP NOTE
Brief Postoperative Note  ______________________________________________________________    Patient: Francesca Penn  YOB: 1975  MRN: [id number]  Date of Procedure: 2/27/2020    Pre-Op Diagnosis: RIGHT DISTAL FRACTURE S52.501A    Post-Op Diagnosis: Same       Procedure(s):  RIGHT DISTAL RADIUS OPEN REDUCTION INTERNAL FIXATION - SABRINA    Anesthesia: General    Surgeon(s):  Maci Galvez MD    Assistant: Neena Mace    Estimated Blood Loss (mL): less than 50     Complications: None    Specimens:   * No specimens in log *    Implants:  Implant Name Type Inv. Item Serial No.  Lot No. LRB No. Used   PLATE VOLAR DR RADER RT 8H EXSHRT Screw/Plate/Nail/Thony PLATE VOLAR DR RADER RT 8H EXSHRT  SABRINA: ORTHOPAEDICS  Right 1   SCREW LOCKING 2. [id number] Screw/Plate/Nail/Thony SCREW LOCKING 2. [id number]  SABRINA: ORTHOPAEDICS  Right 1   SCREW LOCKING [id number] Screw/Plate/Nail/Thony SCREW LOCKING [id number]  SABRINA: ORTHOPAEDICS  Right 1   SCREW LOCKING 2.0ELF66BT Screw/Plate/Nail/Thony SCREW LOCKING [id number]  SABRINA: ORTHOPAEDICS  Right 3   2.7MM LOCKING SCREWS 26MM      Right 2   SCREW LOCKING 2. [id number] Screw/Plate/Nail/Thony SCREW LOCKING 2. [id number]  SABRINA: ORTHOPAEDICS  Right 1   SCREW T8 BONE 2. [id number] Screw/Plate/Nail/Thony SCREW T8 BONE 2. [id number]  SABRINA: ORTHOPAEDICS  Right 1         Drains: * No LDAs found *    Findings: Mark Upton MD  Date: 2/27/2020  Time: 8:26 AM

## 2020-02-27 NOTE — PROGRESS NOTES
Discharge instructions reviewed with patient/wife. All home medications have been reviewed, questions answered and patient verbalized understanding. Discharge instructions signed. Pt dc'd per wheelchair. Pt states that he already has Percocet at home and a simple sling. Patient discharged home with belongings. Wife taking stable pt home.

## 2020-02-27 NOTE — ANESTHESIA PRE PROCEDURE
[facility] Department of Anesthesiology  Pre-Anesthesia Evaluation/Consultation       Name:  Ramila Dunn  : 1975  Age:  38 y.o. MRN:  [id number]  Date: 2020           Surgeon: Surgeon(s):  Vicky Huizar MD    Procedure: Procedure(s):  RIGHT DISTAL RADIUS OPEN REDUCTION INTERNAL FIXATION - SABRINA     Allergies   Allergen Reactions    Hydrocodone     Vicodin [Hydrocodone-Acetaminophen]      Patient Active Problem List   Diagnosis    Disc displacement, lumbar    Lumbar facet arthropathy    Opiate analgesic contract exists    Cervical spondylosis without myelopathy    Closed fracture of right distal radius     History reviewed. No pertinent past medical history. Past Surgical History:   Procedure Laterality Date    ABDOMEN SURGERY      APPENDECTOMY       Social History     Tobacco Use    Smoking status: Current Every Day Smoker     Packs/day: 1.00    Smokeless tobacco: Never Used   Substance Use Topics    Alcohol use: Never     Frequency: Never    Drug use: Never     Medications  No current facility-administered medications on file prior to encounter. Current Outpatient Medications on File Prior to Encounter   Medication Sig Dispense Refill    oxyCODONE-acetaminophen (PERCOCET) 5-325 MG per tablet Take 1 tablet by mouth every 8 hours as needed for Pain for up to 5 days. WARNING:  May cause drowsiness. May impair ability to operate vehicles or machinery. Do not use in combination with alcohol. 10 tablet 0    ibuprofen (ADVIL;MOTRIN) 800 MG tablet Take 1 tablet by mouth every 6 hours as needed for Pain 30 tablet 0    oxyCODONE-acetaminophen (PERCOCET) 5-325 MG per tablet Take 1 tablet by mouth every 8 hours as needed for Pain for up to 5 days.  DO NOT FILL UNTIL 15 tablet 0    cephALEXin (KEFLEX) 500 MG capsule Take 1 capsule by mouth 4 times daily 20 capsule 0     Current Facility-Administered Medications   Medication Dose Route Frequency Provider Last Rate Last Dose    ceFAZolin (ANCEF) 2 g in dextrose 5 % 100 mL IVPB  2 g Intravenous On Call to 6135 aVdim Rodriguez MD        lactated ringers infusion   Intravenous Continuous Aiyana Robertson  mL/hr at 20 0630       Vital Signs (Current)   Vitals:    2015   BP: (!) 146/92   Pulse: 70   Resp: 14   Temp: 98.5 °F (36.9 °C)   SpO2: 99%     Vital Signs Statistics (for past 48 hrs)     Temp  Av.5 °F (36.9 °C)  Min: 98.5 °F (36.9 °C)   Min taken time: 20  Max: 98.5 °F (36.9 °C)   Max taken time: 20  Pulse  Av  Min: 79   Min taken time: 20  Max: 79   Max taken time: 20  Resp  Av  Min: 15   Min taken time: 2015  Max: 15   Max taken time: 2015  BP  Min: 146/92   Min taken time: 2015  Max: 146/92   Max taken time: 2015  SpO2  Av %  Min: 99 %   Min taken time: 20  Max: 99 %   Max taken time: 2015    BP Readings from Last 3 Encounters:   20 (!) 146/92   20 (!) 173/95     BMI  Body mass index is 27.26 kg/m². Estimated body mass index is 27.26 kg/m² as calculated from the following:    Height as of this encounter: 5' 10\" (1.778 m). Weight as of this encounter: 190 lb (86.2 kg). CBC No results found for: WBC, RBC, HGB, HCT, MCV, RDW, PLT  CMP  No results found for: NA, K, CL, CO2, BUN, CREATININE, GFRAA, AGRATIO, LABGLOM, GLUCOSE, PROT, CALCIUM, BILITOT, ALKPHOS, AST, ALT  BMP  No results found for: NA, K, CL, CO2, BUN, CREATININE, CALCIUM, GFRAA, LABGLOM, GLUCOSE  POCGlucose  No results for input(s): GLUCOSE in the last 72 hours.    Coags  No results found for: PROTIME, INR, APTT  HCG (If Applicable) No results found for: PREGTESTUR, PREGSERUM, HCG, HCGQUANT   ABGs No results found for: PHART, PO2ART, COH8WGI, LRC1RYM, BEART, J0FVMFQP   Type & Screen (If Applicable)  No results found for: LABABO, LABRH                         BMI: Wt Readings from Last 3 Encounters:       NPO Status:   Date of last liquid consumption: 02/26/20   Time of last liquid consumption: 2200   Date of last solid food consumption: 02/26/20      Time of last solid consumption: 2200       Anesthesia Evaluation  Patient summary reviewed no history of anesthetic complications:   Airway: Mallampati: III  TM distance: >3 FB   Neck ROM: full   Dental: normal exam         Pulmonary:Negative Pulmonary ROS and normal exam              Patient smoked on day of surgery. Cardiovascular:Negative CV ROS  Exercise tolerance: good (>4 METS),           Rhythm: regular  Rate: normal           Beta Blocker:  Not on Beta Blocker         Neuro/Psych:   Negative Neuro/Psych ROS              GI/Hepatic/Renal: Neg GI/Hepatic/Renal ROS            Endo/Other: Negative Endo/Other ROS                    Abdominal:           Vascular: negative vascular ROS. Anesthesia Plan      general     ASA 2       Induction: intravenous. MIPS: Postoperative opioids intended and Prophylactic antiemetics administered. Anesthetic plan and risks discussed with patient and spouse. Plan discussed with CRNA. This pre-anesthesia assessment may be used as a history and physical.    DOS STAFF ADDENDUM:    Pt seen and examined, chart reviewed (including anesthesia, drug and allergy history). No interval changes to history and physical examination. Anesthetic plan, risks, benefits, alternatives, and personnel involved discussed with patient. Questions and concerns addressed. Patient(family) verbalized an understanding and agrees to proceed.       Nalini Matias MD  February 27, 2020  6:50 AM

## 2020-03-12 ENCOUNTER — OFFICE VISIT (OUTPATIENT)
Dept: ORTHOPEDIC SURGERY | Age: 45
End: 2020-03-12

## 2020-03-12 VITALS — WEIGHT: 190 LBS | RESPIRATION RATE: 16 BRPM | BODY MASS INDEX: 27.2 KG/M2 | HEIGHT: 70 IN

## 2020-03-12 PROCEDURE — 99024 POSTOP FOLLOW-UP VISIT: CPT | Performed by: NURSE PRACTITIONER

## 2020-03-12 PROCEDURE — APPNB15 APP NON BILLABLE TIME 0-15 MINS: Performed by: NURSE PRACTITIONER

## 2020-03-12 PROCEDURE — L3908 WHO COCK-UP NONMOLDE PRE OTS: HCPCS | Performed by: ORTHOPAEDIC SURGERY

## 2020-03-12 NOTE — LETTER
[facility]  [address]  [address]  Phone: [phone]  Fax: [phone]    Ciera Burks MD        March 12, 2020     Patient: Bri Conroy   YOB: 1975   Date of Visit: 3/12/2020       To Whom It May Concern: It is my medical opinion that Bri Conroy may return to work on 03/23/2020 with light duty restrictions: no lifting of the right arm until 05/01/2020. If you have any questions or concerns, please don't hesitate to call.     Sincerely,    Ciera Burks MD

## 2020-03-12 NOTE — PROGRESS NOTES
DIAGNOSIS:  Right distal radius intra-articular 3 parts fracture including die punch fracture, status post ORIF. DATE OF SURGERY: 2/27/2020. HISTORY OF PRESENT ILLNESS:  Mr. Royal Hirsch 38 y.o.  male right handed returns today  for 2 weeks postoperative visit. The patient denies any significant pain in the right wrist.  Rates pain a 0-1/10 VAS mild, aching, intermittent and are improving. Aggravating factors movement. Alleviating factors rest and splint. No numbness or tingling sensation. No fever or Chills. He  is in a splint. He works in a factory. PHYSICAL EXAMINATION:  The incision is completely healed . No signs of any erythema or drainage. He  has no pain with the active or passive range of motion of the right wrist, but decrease ROM. He  has intact sensation, distally, and he  is neurovascularly intact. IMAGING:  Three views right wrist taken today in the office showed anatomic alignment of right distal radius, plate and screws in good position, no loosening. IMPRESSION:  2 weeks out from right distal radius ORIF and doing very well. PLAN:  I have told the patient to work on ROM, as well as strengthening exercises. A removable forearm brace applied. No heavy impact activities. The patient will come back for a follow up in 6 weeks. At that time, we will take 3 views of the right wrist. PT if needed then. As this patient has demonstrated risk factors for osteoporosis, such as age greater than [de-identified] years and evidence of a fracture, I have referred the patient back to the primary care physician for evaluation for osteoporosis, including consideration for DEXA scanning, if this is felt to be clinically indicated. The patient is advised to contact the primary care physician to follow-up for further evaluation. Procedures    Titan Wrist and Forearm Brace     Patient was prescribed a Lizbet Castillo Titan Wrist and Forearm Brace.    The right wrist will require stabilization /

## 2020-04-23 ENCOUNTER — TELEPHONE (OUTPATIENT)
Dept: ORTHOPEDIC SURGERY | Age: 45
End: 2020-04-23

## 2020-04-24 ENCOUNTER — OFFICE VISIT (OUTPATIENT)
Dept: ORTHOPEDIC SURGERY | Age: 45
End: 2020-04-24

## 2020-04-24 VITALS — BODY MASS INDEX: 27.2 KG/M2 | RESPIRATION RATE: 16 BRPM | TEMPERATURE: 97.7 F | HEIGHT: 70 IN | WEIGHT: 190 LBS

## 2020-04-24 PROCEDURE — APPNB15 APP NON BILLABLE TIME 0-15 MINS: Performed by: NURSE PRACTITIONER

## 2020-04-24 PROCEDURE — 99024 POSTOP FOLLOW-UP VISIT: CPT | Performed by: NURSE PRACTITIONER

## 2020-06-10 ENCOUNTER — OFFICE VISIT (OUTPATIENT)
Dept: ORTHOPEDIC SURGERY | Age: 45
End: 2020-06-10
Payer: COMMERCIAL

## 2020-06-10 VITALS — WEIGHT: 190 LBS | BODY MASS INDEX: 27.2 KG/M2 | HEIGHT: 70 IN | RESPIRATION RATE: 16 BRPM | TEMPERATURE: 97.2 F

## 2020-06-10 PROCEDURE — 99213 OFFICE O/P EST LOW 20 MIN: CPT | Performed by: ORTHOPAEDIC SURGERY

## 2020-06-10 PROCEDURE — G8419 CALC BMI OUT NRM PARAM NOF/U: HCPCS | Performed by: ORTHOPAEDIC SURGERY

## 2020-06-10 PROCEDURE — G8427 DOCREV CUR MEDS BY ELIG CLIN: HCPCS | Performed by: ORTHOPAEDIC SURGERY

## 2020-06-10 PROCEDURE — 4004F PT TOBACCO SCREEN RCVD TLK: CPT | Performed by: ORTHOPAEDIC SURGERY

## 2020-06-10 NOTE — PROGRESS NOTES
DIAGNOSIS:  Right distal radius intra-articular 3 parts fracture including die punch fracture, status post ORIF. DATE OF SURGERY: 2/27/2020. HISTORY OF PRESENT ILLNESS:  Nancy Lai 41 y.o.  male right handed returns today for 3 months postoperative visit. The patient denies any significant pain in the right wrist.  Rates pain a 0/10 VAS and is doing much better. He has been working on ROM on his own with good improvement. No numbness or tingling sensation. No fever or Chills. No past medical history on file.     Past Surgical History:   Procedure Laterality Date    ABDOMEN SURGERY      APPENDECTOMY      FOREARM SURGERY Right 2/27/2020    RIGHT DISTAL RADIUS OPEN REDUCTION INTERNAL FIXATION - SABRINA performed by Pierre Narvaez MD at [place] [address] History     Socioeconomic History    Marital status:      Spouse name: Not on file    Number of children: Not on file    Years of education: Not on file    Highest education level: Not on file   Occupational History    Not on file   Social Needs    Financial resource strain: Not on file    Food insecurity     Worry: Not on file     Inability: Not on file    Transportation needs     Medical: Not on file     Non-medical: Not on file   Tobacco Use    Smoking status: Current Every Day Smoker     Packs/day: 1.00    Smokeless tobacco: Never Used   Substance and Sexual Activity    Alcohol use: Never     Frequency: Never    Drug use: Never    Sexual activity: Not on file   Lifestyle    Physical activity     Days per week: Not on file     Minutes per session: Not on file    Stress: Not on file   Relationships    Social connections     Talks on phone: Not on file     Gets together: Not on file     Attends Taoism service: Not on file     Active member of club or organization: Not on file     Attends meetings of clubs or organizations: Not on file     Relationship status: Not on file    Intimate partner violence     Fear of

## 2021-06-25 ENCOUNTER — OFFICE VISIT (OUTPATIENT)
Dept: ORTHOPEDIC SURGERY | Age: 46
End: 2021-06-25
Payer: COMMERCIAL

## 2021-06-25 VITALS — WEIGHT: 190 LBS | HEIGHT: 70 IN | BODY MASS INDEX: 27.2 KG/M2

## 2021-06-25 DIAGNOSIS — M79.642 BILATERAL HAND PAIN: Primary | ICD-10-CM

## 2021-06-25 DIAGNOSIS — M79.641 BILATERAL HAND PAIN: Primary | ICD-10-CM

## 2021-06-25 DIAGNOSIS — R25.2 CRAMPING OF HANDS: ICD-10-CM

## 2021-06-25 PROCEDURE — 4004F PT TOBACCO SCREEN RCVD TLK: CPT | Performed by: ORTHOPAEDIC SURGERY

## 2021-06-25 PROCEDURE — 99214 OFFICE O/P EST MOD 30 MIN: CPT | Performed by: ORTHOPAEDIC SURGERY

## 2021-06-25 PROCEDURE — G8419 CALC BMI OUT NRM PARAM NOF/U: HCPCS | Performed by: ORTHOPAEDIC SURGERY

## 2021-06-25 PROCEDURE — G8427 DOCREV CUR MEDS BY ELIG CLIN: HCPCS | Performed by: ORTHOPAEDIC SURGERY

## 2021-06-26 PROBLEM — M79.642 BILATERAL HAND PAIN: Status: ACTIVE | Noted: 2021-06-26

## 2021-06-26 PROBLEM — R25.2 CRAMPING OF HANDS: Status: ACTIVE | Noted: 2021-06-26

## 2021-06-26 PROBLEM — M79.641 BILATERAL HAND PAIN: Status: ACTIVE | Noted: 2021-06-26

## 2021-06-26 NOTE — PROGRESS NOTES
CHIEF COMPLAINT: Left and Right fingers and hand spasm and pain with no numbness and tingling/ possible cervical radiculopathy. HISTORY:  Mr. Teodora Plascencia is 40 y.o.  male right handed presents today for the first visit for evaluation of a bilateral hand spasm and pain with no numbness and tingling which started 15 years ago and is worsening. Denies trauma or injury. The patient is complaining of bilateral hand pain with cramping with h/o neck pain. This is worse in the morning and nothing makes pain better. No other complaint. The patient is known to me for right distal radius intra-articular 3 parts fracture including die punch fracture, status post ORIF, 2/27/2020. History reviewed. No pertinent past medical history. Past Surgical History:   Procedure Laterality Date    ABDOMEN SURGERY      APPENDECTOMY      FOREARM SURGERY Right 2/27/2020    RIGHT DISTAL RADIUS OPEN REDUCTION INTERNAL FIXATION - SABRINA performed by John Hoover MD at [address] [place] [address] History     Socioeconomic History    Marital status:      Spouse name: Not on file    Number of children: Not on file    Years of education: Not on file    Highest education level: Not on file   Occupational History    Not on file   Tobacco Use    Smoking status: Current Every Day Smoker     Packs/day: 1.00    Smokeless tobacco: Never Used   Vaping Use    Vaping Use: Never used   Substance and Sexual Activity    Alcohol use: Never    Drug use: Never    Sexual activity: Not on file   Other Topics Concern    Not on file   Social History Narrative    Not on file     Social Determinants of Health     Financial Resource Strain:     Difficulty of Paying Living Expenses:    Food Insecurity:     Worried About Running Out of Food in the Last Year:     [address] in the Last Year:    Transportation Needs:     Lack of Transportation (Medical):      Lack of Transportation (Non-Medical):    Physical Activity:     Days of Exercise per Week:     Minutes of Exercise per Session:    Stress:     Feeling of Stress :    Social Connections:     Frequency of Communication with Friends and Family:     Frequency of Social Gatherings with Friends and Family:     Attends Religion Services:     Active Member of Clubs or Organizations:     Attends Club or Organization Meetings:     Marital Status:    Intimate Partner Violence:     Fear of Current or Ex-Partner:     Emotionally Abused:     Physically Abused:     Sexually Abused:        History reviewed. No pertinent family history. Current Outpatient Medications on File Prior to Visit   Medication Sig Dispense Refill    cephALEXin (KEFLEX) 500 MG capsule Take 1 capsule by mouth 4 times daily 20 capsule 0    ibuprofen (ADVIL;MOTRIN) 800 MG tablet Take 1 tablet by mouth every 6 hours as needed for Pain 30 tablet 0     No current facility-administered medications on file prior to visit. Pertinent items are noted in HPI  Review of systems reviewed from Patient History Form dated on 6/25/2021 and available in the patient's chart under the Media tab. No change noted. PHYSICAL EXAMINATION:  Mr. Faina Abernathy is a very pleasant 40 y.o.  male who presents today in no acute distress, awake, alert, and oriented. He is well dressed, nourished and  groomed. Patient with normal affect. Height is  5' 10\" (1.778 m), weight is 190 lb (86.2 kg), Body mass index is 27.26 kg/m². Resting respiratory rate is 16. Examination of the gait, showed that the patient walks with No limp . Examination of both Upper extremities showing a normal range of motion of the bilateral hand. There is no swelling that can be seen. Examination for Carpal Tunnel Syndrome shows Carpal Tunnel Compression Test to be negative on the right & the left. Phalen's Maneuver is negative on the right & the left.   The patient displays no baseline symptoms to potentially confound the exam.  The thenar musculature is not atrophied & weakened. IMAGING: [place] were reviewed, taken today in the office, 3 views of the bilateral hand, and showed no fracture, no other abnormalities. Xray, 3 views right wrist taken 6/10/2021 in the office showed anatomic alignment of right distal radius, plate and screws in good position, no loosening. IMPRESSION:  Left and Right fingers and hand spasm and pain with no numbness and tingling/ possible cervical radiculopathy. PLAN:  I assured the patient that the xray is negative for acute fracture. The patient can take NSAIDs and recommended getting blood work to assess K and Na level. Since he is continuing to have significant symptoms for a long time, we will obtain an EMG of the bilateral UE in order to further evaluate for possible cervical radiculopathy. F/U after EMG.          John Hoover MD

## 2021-07-08 ENCOUNTER — HOSPITAL ENCOUNTER (OUTPATIENT)
Dept: NEUROLOGY | Age: 46
Discharge: HOME | End: 2021-07-08
Payer: COMMERCIAL

## 2021-07-08 DIAGNOSIS — M79.641 BILATERAL HAND PAIN: ICD-10-CM

## 2021-07-08 DIAGNOSIS — R25.2 CRAMPING OF HANDS: ICD-10-CM

## 2021-07-08 DIAGNOSIS — M79.642 BILATERAL HAND PAIN: ICD-10-CM

## 2021-07-08 PROCEDURE — 95886 MUSC TEST DONE W/N TEST COMP: CPT

## 2021-07-08 PROCEDURE — 95910 NRV CNDJ TEST 7-8 STUDIES: CPT

## 2021-07-08 NOTE — PROCEDURES
Test Date:  2021    Patient: Peter Jiménez : 1975 Physician: Tammie Alvarado DO   Sex: Male ID#:  Ref Phys: Merna Shields MD     Patient Complaints:  Patient is a 40year-old male who presents with pain in the hands left more severe with cramping. onset years ago. Patient History / Exam:  PMH no endocrine disease. + right ORIF 18 months ago. No neck surgery PE:  reflexes trace, + thumb opposition weakness     NCV & EMG Findings:  Evaluation of the left median (APB) motor and the right median (APB) motor nerves showed prolonged distal onset latency (L4.5, R4.5 ms). The right ulnar (ADM) motor nerve showed decreased conduction velocity (Abv Elbow-Bel Elbow, 42 m/s). The left median sensory nerve showed prolonged distal peak latency (4.1 ms), reduced amplitude (9 µV), and decreased conduction velocity (34 m/s). The right median sensory nerve showed prolonged distal peak latency (3.8 ms) and decreased conduction velocity (37 m/s). All remaining nerves (as indicated in the following tables) were within normal limits. All examined muscles (as indicated in the following table) showed no evidence of electrical instability. Impression:  Study is consistent with bilateral carpal tunnel syndrome, moderate severity. There is underlying right ulnar neuropathy at elbow, mild severity no evidence of an acute radiculopathy at this time. Thank you.        Tammie Alvarado DO        Nerve Conduction Studies  Motor Nerve Results      Latency Amplitude F-Lat Segment Distance CV Comment   Site (ms) Norm (mV) Norm (ms)  (cm) (m/s) Norm    Left Median (APB) Motor   Wrist 4.5  < 4.2 9.8  > 5.0         Elbow 8.8 - 10.8 -  Elbow-Wrist 24 56  > 50    Right Median (APB) Motor   Wrist 4.5  < 4.2 5.8  > 5.0         Elbow 8.5 - 11.7 -  Elbow-Wrist 23 58  > 50    Left Ulnar (ADM) Motor   Wrist 2.9  < 4.2 9.8  > 3.0         Bel Elbow 7.9 - 8.0 -  Bel Elbow-Wrist 25 50  > 50    Abv Elbow 8.8 - 7.2 -  Abv Elbow-Bel Elbow 6 67  > 48    Right Ulnar (ADM) Motor   Wrist 3.4  < 4.2 8.3  > 3.0         Bel Elbow 7.4 - 4.4 -  Bel Elbow-Wrist 24 60  > 50    Abv Elbow 9.3 - 6.9 -  Abv Elbow-Bel Elbow 8 42  > 48      Sensory Nerve Results      Latency (Peak) Amplitude (P-P) Segment Distance CV Comment   Site (ms) Norm (µV) Norm  (cm) (m/s) Norm    Left Median Sensory   Wrist-Dig II 4.1  < 3.6 9  > 10 Wrist-Dig II 14 34  > 39    Right Median Sensory   Wrist-Dig II 3.8  < 3.6 -  > 10 Wrist-Dig II 14 37  > 39    Left Ulnar Sensory   Wrist-Dig V 3.5  < 3.7 24  > 15 Wrist-Dig V 14 40  > 38    Right Ulnar Sensory   Wrist-Dig V 3.4  < 3.7 37  > 15 Wrist-Dig V 14 41  > 38        Electromyography     Side Muscle Nerve Root Ins Act Fibs Psw Amp Dur Poly Recrt Int Amy Smolder Comment   Right Deltoid Axillary C5-C6 Nml Nml Nml Nml Nml 0 Nml Nml    Right Biceps Musculocut C5-C6 Nml Nml Nml Nml Nml 0 Nml Nml    Right Triceps Radial C6-C8 Nml Nml Nml Nml Nml 0 Nml Nml    Right Brachiorad Radial C5-C6 Nml Nml Nml Nml Nml 0 Nml Nml    Right Pronator Teres Median C6-C7 Nml Nml Nml Nml Nml 0 Nml Nml    Right EIP Post Interosseous,  R... C7-C8 Nml Nml Nml Nml Nml 0 Nml Nml    Right APB Median C8-T1 Nml Nml Nml Nml Nml 0 Nml Nml    Right FDI Ulnar C8-T1 Nml Nml Nml Nml Nml 0 Nml Nml    Right Cervical Paraspinal (Uppe. .. Rami C1-C3 Nml Nml Nml         Right Cervical Paraspinal (Mid) Rami C4-C6 Nml Nml Nml         Right Cervical Paraspinal (Ruthell Yobani. .. Rami C7-C8 Nml Nml Nml         Left Deltoid Axillary C5-C6 Nml Nml Nml Nml Nml 0 Nml Nml    Left Biceps Musculocut C5-C6 Nml Nml Nml Nml Nml 0 Nml Nml    Left Triceps Radial C6-C8 Nml Nml Nml Nml Nml 0 Nml Nml    Left Brachiorad Radial C5-C6 Nml Nml Nml Nml Nml 0 Nml Nml    Left Pronator Teres Median C6-C7 Nml Nml Nml Nml Nml 0 Nml Nml    Left EIP Post Interosseous,  R...  C7-C8 Nml Nml Nml Nml Nml 0 Nml Nml    Left APB Median C8-T1 Nml Nml Nml Nml Nml 0 Nml Nml    Left FDI Ulnar C8-T1 Nml Nml Nml Nml Nml 0 Nml Nml    Left Cervical Paraspinal (Uppe. .. Rami C1-C3 Nml Nml Nml         Left Cervical Paraspinal (Mid) Rami C4-C6 Nml Nml Nml         Left Cervical Paraspinal (Beaufort Venegas. ..  Rami C7-C8 Nml Nml Nml               Electronically signed by Job Garcia DO on 7/8/2021 at 8:50 AM]

## 2021-07-14 ENCOUNTER — TELEPHONE (OUTPATIENT)
Dept: ORTHOPEDIC SURGERY | Age: 46
End: 2021-07-14

## 2021-07-14 ENCOUNTER — OFFICE VISIT (OUTPATIENT)
Dept: ORTHOPEDIC SURGERY | Age: 46
End: 2021-07-14
Payer: COMMERCIAL

## 2021-07-14 VITALS — BODY MASS INDEX: 27.2 KG/M2 | WEIGHT: 190 LBS | HEIGHT: 70 IN

## 2021-07-14 DIAGNOSIS — G56.02 CARPAL TUNNEL SYNDROME OF LEFT WRIST: Primary | ICD-10-CM

## 2021-07-14 DIAGNOSIS — F17.200 CURRENT SMOKER: ICD-10-CM

## 2021-07-14 PROCEDURE — 99406 BEHAV CHNG SMOKING 3-10 MIN: CPT | Performed by: ORTHOPAEDIC SURGERY

## 2021-07-14 PROCEDURE — G8427 DOCREV CUR MEDS BY ELIG CLIN: HCPCS | Performed by: ORTHOPAEDIC SURGERY

## 2021-07-14 PROCEDURE — 4004F PT TOBACCO SCREEN RCVD TLK: CPT | Performed by: ORTHOPAEDIC SURGERY

## 2021-07-14 PROCEDURE — G8419 CALC BMI OUT NRM PARAM NOF/U: HCPCS | Performed by: ORTHOPAEDIC SURGERY

## 2021-07-14 PROCEDURE — 99214 OFFICE O/P EST MOD 30 MIN: CPT | Performed by: ORTHOPAEDIC SURGERY

## 2021-07-14 NOTE — PROGRESS NOTES
CHIEF COMPLAINT: Left > Right fingers and hand spasm and pain with no numbness and tingling/ CTS. HISTORY:  Mr. Joshua Dc is 40 y.o.  male right handed presents today for f/u evaluation of a bilateral hand spasm and pain with no numbness and tingling which started 15 years ago and is worsening. Denies trauma or injury. The patient is complaining of bilateral hand pain with cramping with h/o neck pain 2/10. This is worse in the morning and nothing makes pain better. No other complaint. The patient is known to me for right distal radius intra-articular 3 parts fracture including die punch fracture, status post ORIF, 2/27/2020. He had EMG that showed bilateral CTS. History reviewed. No pertinent past medical history. Past Surgical History:   Procedure Laterality Date    ABDOMEN SURGERY      APPENDECTOMY      FOREARM SURGERY Right 2/27/2020    RIGHT DISTAL RADIUS OPEN REDUCTION INTERNAL FIXATION - SABRINA performed by Ronan Degroot MD at [address] [place] [address] History     Socioeconomic History    Marital status:      Spouse name: Not on file    Number of children: Not on file    Years of education: Not on file    Highest education level: Not on file   Occupational History    Not on file   Tobacco Use    Smoking status: Current Every Day Smoker     Packs/day: 1.00    Smokeless tobacco: Never Used   Vaping Use    Vaping Use: Never used   Substance and Sexual Activity    Alcohol use: Never    Drug use: Never    Sexual activity: Not on file   Other Topics Concern    Not on file   Social History Narrative    Not on file     Social Determinants of Health     Financial Resource Strain:     Difficulty of Paying Living Expenses:    Food Insecurity:     Worried About Running Out of Food in the Last Year:     [address] in the Last Year:    Transportation Needs:     Lack of Transportation (Medical):      Lack of Transportation (Non-Medical):    Physical Activity:     Days of Exercise per Week:     Minutes of Exercise per Session:    Stress:     Feeling of Stress :    Social Connections:     Frequency of Communication with Friends and Family:     Frequency of Social Gatherings with Friends and Family:     Attends [employer] Services:     Active Member of Clubs or Organizations:     Attends Club or Organization Meetings:     Marital Status:    Intimate Partner Violence:     Fear of Current or Ex-Partner:     Emotionally Abused:     Physically Abused:     Sexually Abused:        History reviewed. No pertinent family history. Current Outpatient Medications on File Prior to Visit   Medication Sig Dispense Refill    cephALEXin (KEFLEX) 500 MG capsule Take 1 capsule by mouth 4 times daily 20 capsule 0    ibuprofen (ADVIL;MOTRIN) 800 MG tablet Take 1 tablet by mouth every 6 hours as needed for Pain 30 tablet 0     No current facility-administered medications on file prior to visit. Pertinent items are noted in HPI  Review of systems reviewed from Patient History Form dated on 6/25/2021 and available in the patient's chart under the Media tab. No change noted. PHYSICAL EXAMINATION:  Mr. Marian Daniels is a very pleasant 40 y.o.  male who presents today in no acute distress, awake, alert, and oriented. He is well dressed, nourished and  groomed. Patient with normal affect. Height is  5' 10\" (1.778 m), weight is 190 lb (86.2 kg), Body mass index is 27.26 kg/m². Resting respiratory rate is 16. Examination of the gait, showed that the patient walks with No limp . Examination of both Upper extremities showing a normal range of motion of the bilateral hand. There is no swelling that can be seen. Examination for Carpal Tunnel Syndrome shows Carpal Tunnel Compression Test to be negative on the right & the left. Phalen's Maneuver is negative on the right & the left.   The patient displays no baseline symptoms to potentially confound the exam.  The thenar musculature is not atrophied & weakened. IMAGING: Renee Zay were reviewed, taken 6/25/2021 in the office, 3 views of the bilateral hand, and showed no fracture, no other abnormalities. Xray, 3 views right wrist taken 6/10/2021 in the office showed anatomic alignment of right distal radius, plate and screws in good position, no loosening. EMG bilateral UE dated 7/8/2021 was reviewed and showed   Study is consistent with bilateral carpal tunnel syndrome, moderate severity. There is underlying right ulnar neuropathy at elbow, mild severity no evidence of an acute radiculopathy at this time. IMPRESSION:  Left > Right fingers and hand spasm and pain with no numbness and tingling/ CTS. PLAN:   I discussed with Teresa Gruber the EMG results and the treatment options including both surgical and non-surgical treatment, and that my recommendation is left wrist CTS release. I discussed the risks and benefits of surgery with the patient, including but not limited to infection, bleeding, pain, injury to nerves or blood vessels failure of the surgery and need for additional surgery. All the patient's questions were answered. We discussed an expected post-operative course. He  is understanding of this and wishes to proceed. Surgery on Friday    The patient smokes, and we discussed with the patient the risks of smoking on general health and also on bone and soft tissue healing (delay and non-union), and promised to cut down or stop smoking. Smoking: Educated the patient regarding the hazards of smoking and that it harms their body in many ways. It increases the chance of developing heart disease, lung disease, cancer, and other health problems including poor bone and wound healing. The importance of smoking cessation for optimal bone and wound healing was stressed. This was communicated verbally, 5 Minutes.       Rafa Bishop MD

## 2021-07-14 NOTE — LETTER
[address] [place] [address] Ortho & Spine  Surgery Scheduling Form:    DEMOGRAPHICS:                                                                                                             .    Patient Name:  Jon Heard  Patient :  1975   Patient SS#:      Patient Phone:  [phone] (home)  Alt. Patient Phone:    Patient Address:  [address] [id number]    PCP:  No primary care provider on file. Insurance:   Payor/Plan Subscr  Sex Relation Sub. Ins. ID Effective Group Num   1. MEDICAL MUTUA* CIARA GARCIA 1975 Female Spouse [id number]                                   [address]       DIAGNOSIS & PROCEDURE:                                                                                           .    Diagnosis:   Left carpal tunnel syndrome G56.02  Operation:  Left carpal tunnel release 58662  Location:  [facility]  Surgeon:  Sarah Hughes MD    SCHEDULING INFORMATION:                                                                                         .    Surgeon's Scheduling Instruction:    Requested Date:  21 OR Time: 7:15am  Patient Arrival Time:  6:00am  OR Time Required:  15  Minutes  Anesthesia:  MAC/TIVA    SA Required:  No  Equipment:  none  Mini C-Arm:  No    Standard C-Arm:  No  Status:  Outpatient    PAT Required:  No  Latex Allergy:  no    Defibrilator or Pacemaker:  no  Isolation Precautions:  no  Comments: Rapid Covid test                      Gill Kirkpatrick MD 21   BILLING INFORMATION:                                                                                                  .    Procedure:       CPT Code Modifier                Pre-Certification:

## 2021-07-15 ENCOUNTER — ANESTHESIA EVENT (OUTPATIENT)
Dept: OPERATING ROOM | Age: 46
End: 2021-07-15
Payer: COMMERCIAL

## 2021-07-15 NOTE — PROGRESS NOTES
C-Difficile admission screening and protocol:     * Admitted with diarrhea? YES____    NO__X___     *Prior history of C-Diff. In last 3 months? YES____   NO___X__     *Antibiotic use in the past 6-8 weeks? NO___X___YES______                 If yes which  ANTIBIOTIC AND REASON______     *Prior hospitalization or nursing home in the last month?  YES____   NO_X___

## 2021-07-15 NOTE — PROGRESS NOTES
[address] [facility] time__0600__________        Surgery time____0715________    Take the following medications with a sip of water: Follow your MD/Surgeons pre-procedure instructions regarding your medications    Do not eat or drink anything after 12:00 midnight prior to your surgery. This includes water chewing gum, mints and ice chips. You may brush your teeth and gargle the morning of your surgery, but do not swallow the water     Please see your family doctor/pediatrician for a history and physical and/or concerning medications. Bring any test results/reports from your physicians office. If you are under the care of a heart doctor or specialist doctor, please be aware that you may be asked to them for clearance    You may be asked to stop blood thinners such as Coumadin, Plavix, Fragmin, Lovenox, etc., or any anti-inflammatories such as:  Aspirin, Ibuprofen, Advil, Naproxen prior to your surgery. We also ask that you stop any OTC medications such as fish oil, vitamin E, glucosamine, garlic, Multivitamins, COQ 10, etc.    We ask that you do not smoke 24 hours prior to surgery  We ask that you do not  drink any alcoholic beverages 24 hours prior to surgery     You must make arrangements for a responsible adult to take you home after your surgery. For your safety you will not be allowed to leave alone or drive yourself home. Your surgery will be cancelled if you do not have a ride home. Also for your safety, it is strongly suggested that someone stay with you the first 24 hours after your surgery. A parent or legal guardian must accompany a child scheduled for surgery and plan to stay at the hospital until the child is discharged. Please do not bring other children with you. For your comfort, please wear simple loose fitting clothing to the hospital.  Please do not bring valuables.     Do not wear any make-up or nail polish on your fingers or toes      For your safety, please do not wear any jewelry or body piercing's on the day of surgery. All jewelry must be removed. If you have dentures, they will be removed before going to operating room. For your convenience, we will provide you with a container. If you wear contact lenses or glasses, they will be removed, please bring a case for them. If you have a living will and a durable power of  for healthcare, please bring in a copy. As part of our patient safety program to minimize surgical site infections, we ask you to do the following:    · Please notify your surgeon if you develop any illness between         now and the  day of your surgery. · This includes a cough, cold, fever, sore throat, nausea,         or vomiting, and diarrhea, etc.  ·  Please notify your surgeon if you experience dizziness, shortness         of breath or blurred vision between now and the time of your surgery. Do not shave your operative site 96 hours prior to surgery. For face and neck surgery, men may use an electric razor 48 hours   prior to surgery. You may shower the night before surgery or the morning of   your surgery with an antibacterial soap. You will need to bring a photo ID and insurance card    [facility] has an onsite pharmacy, would you like to utilize our pharmacy     If you will be staying overnight and use a C-pap machine, please bring   your C-pap to hospital     Our goal is to provide you with excellent care, therefore, visitors will be limited to two(2) in the room at a time so that we may focus on providing this care for you. Please contact pre-admission testing if you have any further questions. [facility] phone number:  [address] PAT fax number:  [phone]  Please note these are generalized instructions for all surgical cases, you may be provided with more specific instructions according to your surgery.

## 2021-07-16 ENCOUNTER — HOSPITAL ENCOUNTER (OUTPATIENT)
Age: 46
Setting detail: OUTPATIENT SURGERY
Discharge: HOME | End: 2021-07-16
Attending: ORTHOPAEDIC SURGERY | Admitting: ORTHOPAEDIC SURGERY
Payer: COMMERCIAL

## 2021-07-16 ENCOUNTER — ANESTHESIA (OUTPATIENT)
Dept: OPERATING ROOM | Age: 46
End: 2021-07-16
Payer: COMMERCIAL

## 2021-07-16 VITALS
HEART RATE: 55 BPM | BODY MASS INDEX: 27.2 KG/M2 | OXYGEN SATURATION: 100 % | RESPIRATION RATE: 16 BRPM | SYSTOLIC BLOOD PRESSURE: 142 MMHG | HEIGHT: 70 IN | DIASTOLIC BLOOD PRESSURE: 80 MMHG | TEMPERATURE: 97.9 F | WEIGHT: 190 LBS

## 2021-07-16 VITALS — OXYGEN SATURATION: 99 % | SYSTOLIC BLOOD PRESSURE: 91 MMHG | DIASTOLIC BLOOD PRESSURE: 51 MMHG

## 2021-07-16 DIAGNOSIS — G56.02 CARPAL TUNNEL SYNDROME OF LEFT WRIST: Primary | ICD-10-CM

## 2021-07-16 LAB — SARS-COV-2, NAAT: NOT DETECTED

## 2021-07-16 PROCEDURE — 6360000002 HC RX W HCPCS: Performed by: ANESTHESIOLOGY

## 2021-07-16 PROCEDURE — 2580000003 HC RX 258: Performed by: ORTHOPAEDIC SURGERY

## 2021-07-16 PROCEDURE — 2580000003 HC RX 258: Performed by: ANESTHESIOLOGY

## 2021-07-16 PROCEDURE — 7100000011 HC PHASE II RECOVERY - ADDTL 15 MIN: Performed by: ORTHOPAEDIC SURGERY

## 2021-07-16 PROCEDURE — 3600000003 HC SURGERY LEVEL 3 BASE: Performed by: ORTHOPAEDIC SURGERY

## 2021-07-16 PROCEDURE — 3700000000 HC ANESTHESIA ATTENDED CARE: Performed by: ORTHOPAEDIC SURGERY

## 2021-07-16 PROCEDURE — 3700000001 HC ADD 15 MINUTES (ANESTHESIA): Performed by: ORTHOPAEDIC SURGERY

## 2021-07-16 PROCEDURE — 3600000013 HC SURGERY LEVEL 3 ADDTL 15MIN: Performed by: ORTHOPAEDIC SURGERY

## 2021-07-16 PROCEDURE — 64721 CARPAL TUNNEL SURGERY: CPT | Performed by: ORTHOPAEDIC SURGERY

## 2021-07-16 PROCEDURE — 7100000001 HC PACU RECOVERY - ADDTL 15 MIN: Performed by: ORTHOPAEDIC SURGERY

## 2021-07-16 PROCEDURE — 7100000000 HC PACU RECOVERY - FIRST 15 MIN: Performed by: ORTHOPAEDIC SURGERY

## 2021-07-16 PROCEDURE — 2500000003 HC RX 250 WO HCPCS: Performed by: ORTHOPAEDIC SURGERY

## 2021-07-16 PROCEDURE — 6360000002 HC RX W HCPCS

## 2021-07-16 PROCEDURE — 2709999900 HC NON-CHARGEABLE SUPPLY: Performed by: ORTHOPAEDIC SURGERY

## 2021-07-16 PROCEDURE — 87635 SARS-COV-2 COVID-19 AMP PRB: CPT

## 2021-07-16 PROCEDURE — 2500000003 HC RX 250 WO HCPCS

## 2021-07-16 PROCEDURE — 7100000010 HC PHASE II RECOVERY - FIRST 15 MIN: Performed by: ORTHOPAEDIC SURGERY

## 2021-07-16 RX ORDER — FENTANYL CITRATE 50 UG/ML
INJECTION, SOLUTION INTRAMUSCULAR; INTRAVENOUS PRN
Status: DISCONTINUED | OUTPATIENT
Start: 2021-07-16 | End: 2021-07-16 | Stop reason: SDUPTHER

## 2021-07-16 RX ORDER — LABETALOL HYDROCHLORIDE 5 MG/ML
5 INJECTION, SOLUTION INTRAVENOUS EVERY 10 MIN PRN
Status: DISCONTINUED | OUTPATIENT
Start: 2021-07-16 | End: 2021-07-16 | Stop reason: HOSPADM

## 2021-07-16 RX ORDER — MIDAZOLAM HYDROCHLORIDE 1 MG/ML
INJECTION INTRAMUSCULAR; INTRAVENOUS PRN
Status: DISCONTINUED | OUTPATIENT
Start: 2021-07-16 | End: 2021-07-16 | Stop reason: SDUPTHER

## 2021-07-16 RX ORDER — FENTANYL CITRATE 50 UG/ML
25 INJECTION, SOLUTION INTRAMUSCULAR; INTRAVENOUS EVERY 5 MIN PRN
Status: DISCONTINUED | OUTPATIENT
Start: 2021-07-16 | End: 2021-07-16 | Stop reason: HOSPADM

## 2021-07-16 RX ORDER — LIDOCAINE HYDROCHLORIDE 20 MG/ML
INJECTION, SOLUTION EPIDURAL; INFILTRATION; INTRACAUDAL; PERINEURAL PRN
Status: DISCONTINUED | OUTPATIENT
Start: 2021-07-16 | End: 2021-07-16 | Stop reason: SDUPTHER

## 2021-07-16 RX ORDER — SODIUM CHLORIDE 9 MG/ML
25 INJECTION, SOLUTION INTRAVENOUS PRN
Status: DISCONTINUED | OUTPATIENT
Start: 2021-07-16 | End: 2021-07-16 | Stop reason: HOSPADM

## 2021-07-16 RX ORDER — SODIUM CHLORIDE 0.9 % (FLUSH) 0.9 %
10 SYRINGE (ML) INJECTION PRN
Status: DISCONTINUED | OUTPATIENT
Start: 2021-07-16 | End: 2021-07-16 | Stop reason: HOSPADM

## 2021-07-16 RX ORDER — MAGNESIUM HYDROXIDE 1200 MG/15ML
LIQUID ORAL CONTINUOUS PRN
Status: COMPLETED | OUTPATIENT
Start: 2021-07-16 | End: 2021-07-16

## 2021-07-16 RX ORDER — TRAMADOL HYDROCHLORIDE 50 MG/1
50 TABLET ORAL EVERY 6 HOURS PRN
Qty: 12 TABLET | Refills: 0 | Status: SHIPPED | OUTPATIENT
Start: 2021-07-16 | End: 2021-07-19

## 2021-07-16 RX ORDER — CEPHALEXIN 500 MG/1
500 CAPSULE ORAL 4 TIMES DAILY
Qty: 12 CAPSULE | Refills: 0 | Status: SHIPPED | OUTPATIENT
Start: 2021-07-16 | End: 2021-07-19

## 2021-07-16 RX ORDER — SODIUM CHLORIDE 9 MG/ML
INJECTION, SOLUTION INTRAVENOUS CONTINUOUS
Status: DISCONTINUED | OUTPATIENT
Start: 2021-07-16 | End: 2021-07-16 | Stop reason: HOSPADM

## 2021-07-16 RX ORDER — SODIUM CHLORIDE 0.9 % (FLUSH) 0.9 %
10 SYRINGE (ML) INJECTION EVERY 12 HOURS SCHEDULED
Status: DISCONTINUED | OUTPATIENT
Start: 2021-07-16 | End: 2021-07-16 | Stop reason: HOSPADM

## 2021-07-16 RX ORDER — PROMETHAZINE HYDROCHLORIDE 25 MG/ML
6.25 INJECTION, SOLUTION INTRAMUSCULAR; INTRAVENOUS
Status: DISCONTINUED | OUTPATIENT
Start: 2021-07-16 | End: 2021-07-16 | Stop reason: HOSPADM

## 2021-07-16 RX ORDER — PROPOFOL 10 MG/ML
INJECTION, EMULSION INTRAVENOUS CONTINUOUS PRN
Status: DISCONTINUED | OUTPATIENT
Start: 2021-07-16 | End: 2021-07-16 | Stop reason: SDUPTHER

## 2021-07-16 RX ORDER — PROPOFOL 10 MG/ML
INJECTION, EMULSION INTRAVENOUS PRN
Status: DISCONTINUED | OUTPATIENT
Start: 2021-07-16 | End: 2021-07-16 | Stop reason: SDUPTHER

## 2021-07-16 RX ADMIN — FENTANYL CITRATE 50 MCG: 50 INJECTION INTRAMUSCULAR; INTRAVENOUS at 07:14

## 2021-07-16 RX ADMIN — HYDROMORPHONE HYDROCHLORIDE 0.5 MG: 1 INJECTION, SOLUTION INTRAMUSCULAR; INTRAVENOUS; SUBCUTANEOUS at 07:55

## 2021-07-16 RX ADMIN — MIDAZOLAM 2 MG: 1 INJECTION INTRAMUSCULAR; INTRAVENOUS at 07:05

## 2021-07-16 RX ADMIN — PROPOFOL 140 MCG/KG/MIN: 10 INJECTION, EMULSION INTRAVENOUS at 07:11

## 2021-07-16 RX ADMIN — LIDOCAINE HYDROCHLORIDE 60 MG: 20 INJECTION, SOLUTION EPIDURAL; INFILTRATION; INTRACAUDAL; PERINEURAL at 07:11

## 2021-07-16 RX ADMIN — HYDROMORPHONE HYDROCHLORIDE 0.5 MG: 1 INJECTION, SOLUTION INTRAMUSCULAR; INTRAVENOUS; SUBCUTANEOUS at 07:46

## 2021-07-16 RX ADMIN — PROPOFOL 120 MG: 10 INJECTION, EMULSION INTRAVENOUS at 07:11

## 2021-07-16 RX ADMIN — SODIUM CHLORIDE: 9 INJECTION, SOLUTION INTRAVENOUS at 06:46

## 2021-07-16 RX ADMIN — FENTANYL CITRATE 50 MCG: 50 INJECTION INTRAMUSCULAR; INTRAVENOUS at 07:06

## 2021-07-16 ASSESSMENT — PULMONARY FUNCTION TESTS
PIF_VALUE: 0

## 2021-07-16 ASSESSMENT — PAIN DESCRIPTION - PROGRESSION
CLINICAL_PROGRESSION: NOT CHANGED
CLINICAL_PROGRESSION: GRADUALLY IMPROVING
CLINICAL_PROGRESSION: GRADUALLY IMPROVING

## 2021-07-16 ASSESSMENT — PAIN DESCRIPTION - ORIENTATION
ORIENTATION: LEFT

## 2021-07-16 ASSESSMENT — PAIN - FUNCTIONAL ASSESSMENT
PAIN_FUNCTIONAL_ASSESSMENT: ACTIVITIES ARE NOT PREVENTED
PAIN_FUNCTIONAL_ASSESSMENT: ACTIVITIES ARE NOT PREVENTED
PAIN_FUNCTIONAL_ASSESSMENT: 0-10
PAIN_FUNCTIONAL_ASSESSMENT: ACTIVITIES ARE NOT PREVENTED

## 2021-07-16 ASSESSMENT — PAIN DESCRIPTION - ONSET
ONSET: ON-GOING

## 2021-07-16 ASSESSMENT — PAIN DESCRIPTION - LOCATION
LOCATION: WRIST
LOCATION: HAND
LOCATION: HAND;WRIST

## 2021-07-16 ASSESSMENT — PAIN SCALES - GENERAL
PAINLEVEL_OUTOF10: 5
PAINLEVEL_OUTOF10: 7
PAINLEVEL_OUTOF10: 7
PAINLEVEL_OUTOF10: 5

## 2021-07-16 ASSESSMENT — PAIN DESCRIPTION - PAIN TYPE
TYPE: SURGICAL PAIN
TYPE: SURGICAL PAIN;ACUTE PAIN
TYPE: ACUTE PAIN;SURGICAL PAIN

## 2021-07-16 ASSESSMENT — PAIN DESCRIPTION - FREQUENCY
FREQUENCY: CONTINUOUS

## 2021-07-16 ASSESSMENT — PAIN DESCRIPTION - DESCRIPTORS
DESCRIPTORS: ACHING
DESCRIPTORS: ACHING;NUMBNESS
DESCRIPTORS: ACHING

## 2021-07-16 NOTE — BRIEF OP NOTE
Brief Postoperative Note      Patient: Cristina Thompson  YOB: 1975  MRN: [id number]    Date of Procedure: 7/16/2021    Pre-Op Diagnosis: LEFT CARPAL TUNNEL SYNDROME    Post-Op Diagnosis: Same       Procedure(s):  LEFT CARPAL TUNNEL RELEASE    Surgeon(s):  Garett Kearney MD    Assistant:  Surgical Assistant: Marry Hoover    Anesthesia: Monitor Anesthesia Care    Estimated Blood Loss (mL): Minimal    Complications: None    Specimens:   * No specimens in log *    Implants:  * No implants in log *      Drains: * No LDAs found *    Findings: Same    Electronically signed by Garett Kearney MD on 7/16/2021 at 7:47 AM Clothing

## 2021-07-16 NOTE — ANESTHESIA POSTPROCEDURE EVALUATION
[facility] Department of Anesthesiology  Post-Anesthesia Note       Name:  Ollie Chavez                                  Age:  40 y.o. MRN:  [id number]     Last Vitals & Oxygen Saturation: BP (!) 142/80   Pulse 55   Temp 97.9 °F (36.6 °C) (Temporal)   Resp 16   Ht 5' 10\" (1.778 m)   Wt 190 lb (86.2 kg)   SpO2 100%   BMI 27.26 kg/m²   Patient Vitals for the past 4 hrs:   BP Temp Temp src Pulse Resp SpO2   07/16/21 0825 (!) 142/80   55 16 100 %   07/16/21 0806 136/84 97.9 °F (36.6 °C) Temporal 60 18 99 %   07/16/21 0802 (!) 142/100 97.5 °F (36.4 °C)  63 17 100 %   07/16/21 0751 (!) 144/100   66 14 100 %   07/16/21 0747 (!) 126/100   69 16 100 %   07/16/21 0741 (!) 128/92   75 21 96 %   07/16/21 0736 109/77   61 18 98 %   07/16/21 0731 101/76 97.5 °F (36.4 °C) Temporal 63 12 97 %   07/16/21 0639 (!) 144/90 98.1 °F (36.7 °C) Temporal 57 18 97 %       Level of consciousness:  Awake, alert    Respiratory: Respirations easy, no distress. Stable. Cardiovascular: Hemodynamically stable. Hydration: Adequate. PONV: Adequately managed. Post-op pain: Adequately controlled. Post-op assessment: Tolerated anesthetic well without complication. Complications:  None.     Jose Hewitt MD  July 16, 2021   10:20 AM

## 2021-07-16 NOTE — PROGRESS NOTES
Pt to PACU. Unresponsive with oral airway in place. VSS. Does not appear to be in any distress. LUE dressing D&I. Fingers warm to touch, normal brachial pulse, brisk cap refill. IV site WDL.

## 2021-07-16 NOTE — LETTER
Faheem Acevedogo was scheduled for surgery on 7/16/2021. Lizzeth Nanda accompanied Edy Null and provided care after surgery. If you have any questions or concerns, please don't hesitate to call.       Thank Amirah Singh RN

## 2021-07-16 NOTE — PROGRESS NOTES
Patient arrived from PACU alert and oriented, vss, pain rated 5/10. Dressing on left hand/wrist clean, dry and intact.

## 2021-07-16 NOTE — PROGRESS NOTES
Verbal and written discharge instructions were given to the patient and family, patient and family verbalize understanding of discharge instructions including medications orders for home and possible side effects associated with them. Patient instructed and verbalizes understanding to call the doctor listed if they should have any complications or worsening symptoms. Verbalizes understanding about follow-up appointments. D/C IV patient tolerated well, no signs of bleeding. Patient discharged home with all belongings.

## 2021-07-16 NOTE — ANESTHESIA PRE PROCEDURE
[facility] Department of Anesthesiology  Pre-Anesthesia Evaluation/Consultation       Name:  Jaswinder Cruz  : 1975  Age:  40 y.o. MRN:  [id number]  Date: 2021           Surgeon: Surgeon(s):  Amparo Mcfarland MD    Procedure: Procedure(s):  LEFT CARPAL TUNNEL RELEASE     Allergies   Allergen Reactions    Vicodin [Hydrocodone-Acetaminophen] Itching and Rash     Patient Active Problem List   Diagnosis    Disc displacement, lumbar    Lumbar facet arthropathy    Opiate analgesic contract exists    Cervical spondylosis without myelopathy    Closed fracture of right distal radius    Bilateral hand pain    Cramping of hands    Current smoker    Carpal tunnel syndrome of left wrist     History reviewed. No pertinent past medical history. Past Surgical History:   Procedure Laterality Date    ABDOMEN SURGERY      exploratory surgery during y--had colon resection    APPENDECTOMY      FOREARM SURGERY Right 2020    RIGHT DISTAL RADIUS OPEN REDUCTION INTERNAL FIXATION - SABRINA performed by Amparo Mcfarland MD at [address] History     Tobacco Use    Smoking status: Current Every Day Smoker     Packs/day: 1.00     Years: 15.00     Pack years: 15.00     Types: Cigarettes    Smokeless tobacco: Never Used   Vaping Use    Vaping Use: Never used   Substance Use Topics    Alcohol use: Not Currently    Drug use: Never     Medications  No current facility-administered medications on file prior to encounter. No current outpatient medications on file prior to encounter.      Current Facility-Administered Medications   Medication Dose Route Frequency Provider Last Rate Last Admin    0.9 % sodium chloride infusion   Intravenous Continuous Stacey Taveras  mL/hr at 21 0646 New Bag at 21 0646    sodium chloride flush 0.9 % injection 10 mL  10 mL Intravenous 2 times per day Stacey Taveras MD        sodium chloride consumption: 07/15/21   Time of last liquid consumption: 2300   Date of last solid food consumption: 07/15/21      Time of last solid consumption: 2300       Anesthesia Evaluation  Patient summary reviewed no history of anesthetic complications:   Airway: Mallampati: III  TM distance: >3 FB   Neck ROM: full   Dental: normal exam         Pulmonary:Negative Pulmonary ROS and normal exam                               Cardiovascular:Negative CV ROS  Exercise tolerance: good (>4 METS),           Rhythm: regular  Rate: normal           Beta Blocker:  Not on Beta Blocker         Neuro/Psych:   (+) neuromuscular disease:,             GI/Hepatic/Renal: Neg GI/Hepatic/Renal ROS            Endo/Other: Negative Endo/Other ROS                    Abdominal:             Vascular: negative vascular ROS. Other Findings:             Anesthesia Plan      MAC     ASA 2       Induction: intravenous. MIPS: Postoperative opioids intended and Prophylactic antiemetics administered. Anesthetic plan and risks discussed with patient and spouse. Plan discussed with CRNA. This pre-anesthesia assessment may be used as a history and physical.    DOS STAFF ADDENDUM:    Pt seen and examined, chart reviewed (including anesthesia, drug and allergy history). No interval changes to history and physical examination. Anesthetic plan, risks, benefits, alternatives, and personnel involved discussed with patient. Questions and concerns addressed. Patient(family) verbalized an understanding and agrees to proceed.       Masha Guzman MD  July 16, 2021  6:48 AM

## 2021-07-16 NOTE — OP NOTE
[address] [facility]                                OPER[facility]IVE REPORT    PATIENT NAME: Dina Padilla                      :        1975  MED REC NO:   [id number]                          ROOM:  ACCOUNT NO:   [de-identified]                           ADMIT DATE: 2021  PROVIDER:     Renee Rhoades MD    DATE OF PROCEDURE:  2021    PREOPERATIVE DIAGNOSIS:  Left wrist carpal tunnel syndrome. POSTOPERATIVE DIAGNOSIS:  Left wrist carpal tunnel syndrome. OPERATION PERFORMED:  Left carpal tunnel release with neuroplasty,  median nerve at the wrist.    SURGEON:  Renee Rhoades MD    ASSISTANT:  Yesy Duffy Surgical Assistant    ANESTHESIA:  Local MAC.    TOURNIQUET:  Left forearm, 250 mmHg. ESTIMATED BLOOD LOSS:  Minimal.    COMPLICATIONS:  None. INDICATIONS:  This is a 71-year-old male, right-hand dominant, who was  complaining of bilateral, left more than right, hand spasms, numbness,  and tingling, confirmed carpal tunnel syndrome with an EMG test.  All  risks, benefits, and alternatives were discussed with the patient. He  wanted to proceed with the surgical treatment. OPERATIVE PROCEDURE:  The patient's left wrist was marked. He received  2 gm of Ancef IV preoperatively. The patient was then brought to the  operating room and underwent local MAC. A well-padded tourniquet was  placed, left upper forearm. The left upper extremity was then prepped  and draped in regular sterile routine fashion. A time-out was called  confirming the patient's name, site, and procedure. Esmarch was used for exsanguination. Tourniquet was inflated to 250  mmHg. A longitudinal incision was made centered over the carpal tunnel. The deep fascia was incised the length of the incision, and then we  exposed the transverse metatarsal ligament.   That was incised with the  knife and then finished the release with the Hy[place]om scissors both  proximally and distally. We achieved a complete release. The median  nerve was intact. At this point, we let the tourniquet down and hemostasis was secured. There was good circulation and vascularity of the median nerve. At this  point, we irrigated the incision copiously with normal saline. We then  closed the skin with a 4-0 nylon. Dressing was then applied in the form  of Xeroform, 4x4, sterile Webril, and a Coban. The patient tolerated the procedure well and was taken to the Recovery  in stable condition. POSTOPERATIVE PLAN:  The patient will be discharged home. He will be  nonweightbearing on the wrist for two weeks.         Samy Brumfield MD    D: 07/16/2021 7:53:38       T: 07/16/2021 9:58:12     /[email or web address]  Job#: [id number]     Doc#: [id number]    CC:

## 2021-07-16 NOTE — H&P
Preoperative H&P Update    The patient's History and Physical in the medical record from 7/14/2021 was reviewed by me today. History reviewed. No pertinent past medical history. Past Surgical History:   Procedure Laterality Date    ABDOMEN SURGERY      exploratory surgery during appy--had colon resection    APPENDECTOMY      FOREARM SURGERY Right 2/27/2020    RIGHT DISTAL RADIUS OPEN REDUCTION INTERNAL FIXATION - SABRINA performed by Gary Shields MD at [facility]     No current facility-administered medications on file prior to encounter. No current outpatient medications on file prior to encounter. Allergies   Allergen Reactions    Vicodin [Hydrocodone-Acetaminophen] Itching and Rash      I reviewed the HPI, medications, allergies, reason for surgery, diagnosis and treatment plan and there has been no change. The patient was evaluated by me today. Physical exam findings for this update include: There were no vitals filed for this visit. Airway is intact  Chest: chest clear, no wheezing, rales, normal symmetric air entry, no tachypnea, retractions or cyanosis  Heart: regular rate and rhythm ; heart sounds normal  Findings on exam of the body region where surgery is to be performed include:  Left wrist CTS.     Electronically signed by Gary Shields MD on 7/16/2021 at 6:35 AM

## 2021-08-04 ENCOUNTER — OFFICE VISIT (OUTPATIENT)
Dept: ORTHOPEDIC SURGERY | Age: 46
End: 2021-08-04
Payer: COMMERCIAL

## 2021-08-04 VITALS — BODY MASS INDEX: 27.2 KG/M2 | HEIGHT: 70 IN | WEIGHT: 190 LBS

## 2021-08-04 DIAGNOSIS — G56.02 CARPAL TUNNEL SYNDROME OF LEFT WRIST: ICD-10-CM

## 2021-08-04 DIAGNOSIS — G56.01 CARPAL TUNNEL SYNDROME OF RIGHT WRIST: Primary | ICD-10-CM

## 2021-08-04 DIAGNOSIS — F17.200 CURRENT SMOKER: ICD-10-CM

## 2021-08-04 PROCEDURE — G8419 CALC BMI OUT NRM PARAM NOF/U: HCPCS | Performed by: ORTHOPAEDIC SURGERY

## 2021-08-04 PROCEDURE — 4004F PT TOBACCO SCREEN RCVD TLK: CPT | Performed by: ORTHOPAEDIC SURGERY

## 2021-08-04 PROCEDURE — 99214 OFFICE O/P EST MOD 30 MIN: CPT | Performed by: ORTHOPAEDIC SURGERY

## 2021-08-04 PROCEDURE — 99406 BEHAV CHNG SMOKING 3-10 MIN: CPT | Performed by: ORTHOPAEDIC SURGERY

## 2021-08-04 PROCEDURE — G8427 DOCREV CUR MEDS BY ELIG CLIN: HCPCS | Performed by: ORTHOPAEDIC SURGERY

## 2021-08-04 NOTE — LETTER
[address] [place] [address] Ortho & Spine  Surgery Scheduling Form:    DEMOGRAPHICS:                                                                                                             .    Patient Name:  Emmie Post  Patient :  1975   Patient SS#:      Patient Phone:  [phone] (home)  Alt. Patient Phone:    Patient Address:  [address]   [address] [address]    PCP:  No primary care provider on file. Insurance:   Payor/Plan Subscr  Sex Relation Sub. Ins. ID Effective Group Num   1. MEDICAL MUTUA* CIARA GARCIA 1975 Female Spouse [id number] 20 [id number]                                   [address]       DIAGNOSIS & PROCEDURE:                                                                                           .    Diagnosis:   Left carpal tunnel syndrome G56.02  Operation:  Left carpal tunnel release 99521  Location:  [facility]  Surgeon:  Kanu Gill MD    SCHEDULING INFORMATION:    21                                                                                     . Surgeon's Scheduling Instruction:    Requested Date:   OR Time:  Patient Arrival Time:    OR Time Required:  15  Minutes  Anesthesia:  MAC/TIVA    SA Required:  No  Equipment:  none  Mini C-Arm:  No    Standard C-Arm:  No  Status:  Outpatient    PAT Required:  No  Latex Allergy:  no    Defibrilator or Pacemaker:  no  Isolation Precautions:  no  Comments: Rapid Covid test                      Gill Vee MD 21   BILLING INFORMATION:                                                                                                  .    Procedure:       CPT Code Modifier                Pre-Certification:

## 2021-08-05 NOTE — PROGRESS NOTES
CHIEF COMPLAINT:   1-Right fingers and hand spasm and pain with numbness and tingling/ CTS. 2-Left wrist carpal tunnel syndrome, s/p carpal tunnel releasse with neuroplasty of the median nerve      DATE OF SURGERY: 7/16/2021      HISTORY:  Mr. Ernst Galeazzi is 40 y.o.  male right handed presents today for post op evaluation of left carpal tunnel release. He is doing very well since surgery and reports improvement in his numbness and tingling and pain. Rates pain a 0/10 VAS in left hand. He is here with worsening c/o right hand spasm and pain with no numbness and tingling which started 15 years ago and is worsening. Denies trauma or injury. The patient is complaining of right hand pain with cramping with h/o neck pain 2/10. This is worse in the morning and nothing makes pain better. He would like to proceed with the right carpal tunnel release as soon as possible. No other complaint. The patient is known to me for right distal radius intra-articular 3 parts fracture including die punch fracture, status post ORIF, 2/27/2020. He had EMG that showed bilateral CTS. History reviewed. No pertinent past medical history.     Past Surgical History:   Procedure Laterality Date    ABDOMEN SURGERY      exploratory surgery during appy--had colon resection    APPENDECTOMY      CARPAL TUNNEL RELEASE Left 7/16/2021    LEFT CARPAL TUNNEL RELEASE performed by Jacqueline Lovell MD at [address] [place] [address] Right 2/27/2020    RIGHT DISTAL RADIUS OPEN REDUCTION INTERNAL FIXATION - SABRINA performed by Jacqueline Lovell MD at [address] [place] [address] History     Socioeconomic History    Marital status:      Spouse name: Not on file    Number of children: Not on file    Years of education: Not on file    Highest education level: Not on file   Occupational History    Not on file   Tobacco Use    Smoking status: Current Every Day Smoker     Packs/day: 1.00     Years: 15.00     Pack years: 15.00     Types: Cigarettes    Smokeless tobacco: Never Used   Vaping Use    Vaping Use: Never used   Substance and Sexual Activity    Alcohol use: Not Currently    Drug use: Never    Sexual activity: Not Currently   Other Topics Concern    Not on file   Social History Narrative    Not on file     Social Determinants of Health     Financial Resource Strain:     Difficulty of Paying Living Expenses:    Food Insecurity:     Worried About Running Out of Food in the Last Year:     [address] in the Last Year:    Transportation Needs:     Lack of Transportation (Medical):  Lack of Transportation (Non-Medical):    Physical Activity:     Days of Exercise per Week:     Minutes of Exercise per Session:    Stress:     Feeling of Stress :    Social Connections:     Frequency of Communication with Friends and Family:     Frequency of Social Gatherings with Friends and Family:     Attends Mosque Services:     Active Member of Clubs or Organizations:     Attends Club or Organization Meetings:     Marital Status:    Intimate Partner Violence:     Fear of Current or Ex-Partner:     Emotionally Abused:     Physically Abused:     Sexually Abused:        Family History   Problem Relation Age of Onset    No Known Problems Mother     Other Father         agent orange        No current outpatient medications on file prior to visit. No current facility-administered medications on file prior to visit. Pertinent items are noted in HPI  Review of systems reviewed from Patient History Form dated on 6/25/2021 and available in the patient's chart under the Media tab. No change noted. PHYSICAL EXAMINATION:  Mr. Red Urbina is a very pleasant 40 y.o.  male who presents today in no acute distress, awake, alert, and oriented. He is well dressed, nourished and  groomed. Patient with normal affect. Height is  5' 10\" (1.778 m), weight is 190 lb (86.2 kg), Body mass index is 27.26 kg/m². Resting respiratory rate is 16. Examination of the gait, showed that the patient walks with no limp . Examination of both upper extremities showing a normal range of motion of the bilateral hand. There is no swelling that can be seen. The left hand sutures are intact and removed today. The incision left hand is healing well. He is neurovascularly intact distally. Examination for Carpal Tunnel Syndrome shows Carpal Tunnel Compression Test to be positive on the right. Phalen's Maneuver is positive on the right. The patient displays no baseline symptoms to potentially confound the exam.  The thenar musculature is not atrophied & weakened. IMAGING: Trinna Mantle were reviewed, taken 6/25/2021 in the office, 3 views of the bilateral hand, and showed no fracture, no other abnormalities. Xray, 3 views right wrist taken 6/10/2021 in the office showed anatomic alignment of right distal radius, plate and screws in good position, no loosening. EMG bilateral UE dated 7/8/2021 was reviewed and showed   Study is consistent with bilateral carpal tunnel syndrome, moderate severity. There is underlying right ulnar neuropathy at elbow, mild severity no evidence of an acute radiculopathy at this time. IMPRESSION:      1-Right fingers and hand spasm and pain with numbness and tingling/ CTS, 7/16/2021. 2-Left wrist carpal tunnel syndrome, s/p carpal tunnel releasse with neuroplasty of the median nerve     PLAN:   I discussed with Kashmir Nixon the EMG results and the treatment options including both surgical and non-surgical treatment, and that my recommendation is right wrist CTS release. For the left hand he was instructed to avoid heavy impact for another 4 weeks. I discussed the risks and benefits of surgery with the patient, including but not limited to infection, bleeding, pain, injury to nerves or blood vessels failure of the surgery and need for additional surgery. All the patient's questions were answered.    We discussed an expected post-operative course. He  is understanding of this and wishes to proceed with the right carpal tunnel release. Surgery on 8/13 at [facility].    The patient smokes, and we discussed with the patient the risks of smoking on general health and also on bone and soft tissue healing (delay and non-union), and promised to cut down or stop smoking. Smoking: Educated the patient regarding the hazards of smoking and that it harms their body in many ways. It increases the chance of developing heart disease, lung disease, cancer, and other health problems including poor bone and wound healing. The importance of smoking cessation for optimal bone and wound healing was stressed. This was communicated verbally, 5 Minutes.       Jalen Sol MD

## 2021-08-08 PROBLEM — G56.01 CARPAL TUNNEL SYNDROME OF RIGHT WRIST: Status: ACTIVE | Noted: 2021-08-08

## 2021-08-10 NOTE — PROGRESS NOTES
SAFETY FIRST. .call before you fall      [address]  time__0600Preoperative Screening for Elective Surgery/Invasive Procedures While COVID-19 present in the community     Have you tested positive or have been told to self-isolate for COVID-19 like symptoms within the past 28 days? no   Do you currently have any of the following symptoms?no  o Fever >100.0 F or 99.9 F in immunocompromised patients? o New onset cough, shortness of breath or difficulty breathing?  o New onset sore throat, myalgia (muscle aches and pains), headache, loss of taste/smell or diarrhea?  Have you had a potential exposure to COVID-19 within the past 14 days by:  o Close contact with a confirmed case? o Close contact with a healthcare worker,  or essential infrastructure worker (grocery store, [employer], gas station, public utilities or transportation)? o Do you reside in a congregate setting such as; skilled nursing facility, adult home, correctional facility, homeless shelter or other institutional setting?  o Have you had recent travel to a known COVID-19 hotspot? Indicate if the patient has a positive screen by answering yes to one or more of the above questions. Patients who test positive or screen positive prior to surgery or on the day of surgery should be evaluated in conjunction with the surgeon/proceduralist/anesthesiologist to determine the urgency of the procedure. __Arrival time 0600________        Surgery time____________    Take the following medications with a sip of water: Follow your Doctor's pre procedure instructions regarding medications    Do not eat or drink anything after 12:00 midnight prior to your surgery. This includes water chewing gum, mints and ice chips.    You may brush your teeth and gargle the morning of your surgery, but do not swallow the water     Please see your family doctor/pediatrician for a history and physical and/or concerning medications. Bring any test results/reports from your physicians office. If you are under the care of a heart doctor or specialist doctor, please be aware that you may be asked to them for clearance    You may be asked to stop blood thinners such as Coumadin, Plavix, Fragmin, Lovenox, etc., or any anti-inflammatories such as:  Aspirin, Ibuprofen, Advil, Naproxen prior to your surgery. We also ask that you stop any OTC medications such as fish oil, vitamin E, glucosamine, garlic, Multivitamins, COQ 10, etc.    We ask that you do not smoke 24 hours prior to surgery  We ask that you do not  drink any alcoholic beverages 24 hours prior to surgery     You must make arrangements for a responsible adult to take you home after your surgery. For your safety you will not be allowed to leave alone or drive yourself home. Your surgery will be cancelled if you do not have a ride home. Also for your safety, it is strongly suggested that someone stay with you the first 24 hours after your surgery. A parent or legal guardian must accompany a child scheduled for surgery and plan to stay at the hospital until the child is discharged. Please do not bring other children with you. For your comfort, please wear simple loose fitting clothing to the hospital.  Please do not bring valuables. Do not wear any make-up or nail polish on your fingers or toes      For your safety, please do not wear any jewelry or body piercing's on the day of surgery. All jewelry must be removed. If you have dentures, they will be removed before going to operating room. For your convenience, we will provide you with a container. If you wear contact lenses or glasses, they will be removed, please bring a case for them. If you have a living will and a durable power of  for healthcare, please bring in a copy.      As part of our patient safety program to minimize surgical site infections, we ask you to

## 2021-08-12 ENCOUNTER — ANESTHESIA EVENT (OUTPATIENT)
Dept: OPERATING ROOM | Age: 46
End: 2021-08-12
Payer: COMMERCIAL

## 2021-08-13 ENCOUNTER — ANESTHESIA (OUTPATIENT)
Dept: OPERATING ROOM | Age: 46
End: 2021-08-13
Payer: COMMERCIAL

## 2021-08-13 ENCOUNTER — HOSPITAL ENCOUNTER (OUTPATIENT)
Age: 46
Setting detail: OUTPATIENT SURGERY
Discharge: HOME | End: 2021-08-13
Attending: ORTHOPAEDIC SURGERY | Admitting: ORTHOPAEDIC SURGERY
Payer: COMMERCIAL

## 2021-08-13 VITALS
WEIGHT: 189.82 LBS | DIASTOLIC BLOOD PRESSURE: 95 MMHG | HEART RATE: 63 BPM | OXYGEN SATURATION: 97 % | HEIGHT: 70 IN | RESPIRATION RATE: 16 BRPM | BODY MASS INDEX: 27.17 KG/M2 | TEMPERATURE: 97.7 F | SYSTOLIC BLOOD PRESSURE: 160 MMHG

## 2021-08-13 VITALS
RESPIRATION RATE: 4 BRPM | DIASTOLIC BLOOD PRESSURE: 65 MMHG | OXYGEN SATURATION: 99 % | SYSTOLIC BLOOD PRESSURE: 116 MMHG

## 2021-08-13 DIAGNOSIS — G56.01 CARPAL TUNNEL SYNDROME OF RIGHT WRIST: Primary | ICD-10-CM

## 2021-08-13 LAB — SARS-COV-2, NAAT: NOT DETECTED

## 2021-08-13 PROCEDURE — 87635 SARS-COV-2 COVID-19 AMP PRB: CPT

## 2021-08-13 PROCEDURE — 2580000003 HC RX 258: Performed by: ANESTHESIOLOGY

## 2021-08-13 PROCEDURE — 2580000003 HC RX 258: Performed by: ORTHOPAEDIC SURGERY

## 2021-08-13 PROCEDURE — 7100000011 HC PHASE II RECOVERY - ADDTL 15 MIN: Performed by: ORTHOPAEDIC SURGERY

## 2021-08-13 PROCEDURE — 3600000013 HC SURGERY LEVEL 3 ADDTL 15MIN: Performed by: ORTHOPAEDIC SURGERY

## 2021-08-13 PROCEDURE — 6360000002 HC RX W HCPCS

## 2021-08-13 PROCEDURE — 64721 CARPAL TUNNEL SURGERY: CPT | Performed by: ORTHOPAEDIC SURGERY

## 2021-08-13 PROCEDURE — 7100000010 HC PHASE II RECOVERY - FIRST 15 MIN: Performed by: ORTHOPAEDIC SURGERY

## 2021-08-13 PROCEDURE — 7100000001 HC PACU RECOVERY - ADDTL 15 MIN: Performed by: ORTHOPAEDIC SURGERY

## 2021-08-13 PROCEDURE — 2500000003 HC RX 250 WO HCPCS

## 2021-08-13 PROCEDURE — 3700000001 HC ADD 15 MINUTES (ANESTHESIA): Performed by: ORTHOPAEDIC SURGERY

## 2021-08-13 PROCEDURE — 2500000003 HC RX 250 WO HCPCS: Performed by: ORTHOPAEDIC SURGERY

## 2021-08-13 PROCEDURE — 6360000002 HC RX W HCPCS: Performed by: ORTHOPAEDIC SURGERY

## 2021-08-13 PROCEDURE — 3600000003 HC SURGERY LEVEL 3 BASE: Performed by: ORTHOPAEDIC SURGERY

## 2021-08-13 PROCEDURE — 7100000000 HC PACU RECOVERY - FIRST 15 MIN: Performed by: ORTHOPAEDIC SURGERY

## 2021-08-13 PROCEDURE — 2709999900 HC NON-CHARGEABLE SUPPLY: Performed by: ORTHOPAEDIC SURGERY

## 2021-08-13 PROCEDURE — 3700000000 HC ANESTHESIA ATTENDED CARE: Performed by: ORTHOPAEDIC SURGERY

## 2021-08-13 PROCEDURE — 6370000000 HC RX 637 (ALT 250 FOR IP): Performed by: ANESTHESIOLOGY

## 2021-08-13 RX ORDER — MIDAZOLAM HYDROCHLORIDE 1 MG/ML
INJECTION INTRAMUSCULAR; INTRAVENOUS PRN
Status: DISCONTINUED | OUTPATIENT
Start: 2021-08-13 | End: 2021-08-13 | Stop reason: SDUPTHER

## 2021-08-13 RX ORDER — LIDOCAINE HYDROCHLORIDE 20 MG/ML
INJECTION, SOLUTION EPIDURAL; INFILTRATION; INTRACAUDAL; PERINEURAL PRN
Status: DISCONTINUED | OUTPATIENT
Start: 2021-08-13 | End: 2021-08-13 | Stop reason: SDUPTHER

## 2021-08-13 RX ORDER — FENTANYL CITRATE 50 UG/ML
INJECTION, SOLUTION INTRAMUSCULAR; INTRAVENOUS PRN
Status: DISCONTINUED | OUTPATIENT
Start: 2021-08-13 | End: 2021-08-13 | Stop reason: SDUPTHER

## 2021-08-13 RX ORDER — ONDANSETRON 2 MG/ML
INJECTION INTRAMUSCULAR; INTRAVENOUS PRN
Status: DISCONTINUED | OUTPATIENT
Start: 2021-08-13 | End: 2021-08-13 | Stop reason: SDUPTHER

## 2021-08-13 RX ORDER — LIDOCAINE HYDROCHLORIDE 10 MG/ML
INJECTION, SOLUTION INFILTRATION; PERINEURAL
Status: COMPLETED | OUTPATIENT
Start: 2021-08-13 | End: 2021-08-13

## 2021-08-13 RX ORDER — ONDANSETRON 2 MG/ML
4 INJECTION INTRAMUSCULAR; INTRAVENOUS
Status: DISCONTINUED | OUTPATIENT
Start: 2021-08-13 | End: 2021-08-13 | Stop reason: HOSPADM

## 2021-08-13 RX ORDER — TRAMADOL HYDROCHLORIDE 50 MG/1
50 TABLET ORAL EVERY 6 HOURS PRN
Qty: 12 TABLET | Refills: 0 | Status: SHIPPED | OUTPATIENT
Start: 2021-08-13 | End: 2021-08-16

## 2021-08-13 RX ORDER — TRAMADOL HYDROCHLORIDE 50 MG/1
50 TABLET ORAL
Status: COMPLETED | OUTPATIENT
Start: 2021-08-13 | End: 2021-08-13

## 2021-08-13 RX ORDER — CEPHALEXIN 500 MG/1
500 CAPSULE ORAL 4 TIMES DAILY
Qty: 12 CAPSULE | Refills: 0 | Status: SHIPPED | OUTPATIENT
Start: 2021-08-13 | End: 2021-08-16

## 2021-08-13 RX ORDER — SODIUM CHLORIDE 0.9 % (FLUSH) 0.9 %
10 SYRINGE (ML) INJECTION EVERY 12 HOURS SCHEDULED
Status: DISCONTINUED | OUTPATIENT
Start: 2021-08-13 | End: 2021-08-13 | Stop reason: HOSPADM

## 2021-08-13 RX ORDER — SODIUM CHLORIDE 9 MG/ML
INJECTION, SOLUTION INTRAVENOUS CONTINUOUS
Status: DISCONTINUED | OUTPATIENT
Start: 2021-08-13 | End: 2021-08-13 | Stop reason: HOSPADM

## 2021-08-13 RX ORDER — PROPOFOL 10 MG/ML
INJECTION, EMULSION INTRAVENOUS PRN
Status: DISCONTINUED | OUTPATIENT
Start: 2021-08-13 | End: 2021-08-13 | Stop reason: SDUPTHER

## 2021-08-13 RX ORDER — SODIUM CHLORIDE 0.9 % (FLUSH) 0.9 %
10 SYRINGE (ML) INJECTION PRN
Status: DISCONTINUED | OUTPATIENT
Start: 2021-08-13 | End: 2021-08-13 | Stop reason: HOSPADM

## 2021-08-13 RX ORDER — MAGNESIUM HYDROXIDE 1200 MG/15ML
LIQUID ORAL CONTINUOUS PRN
Status: COMPLETED | OUTPATIENT
Start: 2021-08-13 | End: 2021-08-13

## 2021-08-13 RX ORDER — SODIUM CHLORIDE 9 MG/ML
25 INJECTION, SOLUTION INTRAVENOUS PRN
Status: DISCONTINUED | OUTPATIENT
Start: 2021-08-13 | End: 2021-08-13 | Stop reason: HOSPADM

## 2021-08-13 RX ORDER — BUPIVACAINE HYDROCHLORIDE 5 MG/ML
INJECTION, SOLUTION EPIDURAL; INTRACAUDAL
Status: COMPLETED | OUTPATIENT
Start: 2021-08-13 | End: 2021-08-13

## 2021-08-13 RX ADMIN — PROPOFOL 50 MG: 10 INJECTION, EMULSION INTRAVENOUS at 07:33

## 2021-08-13 RX ADMIN — CEFAZOLIN 2000 MG: 10 INJECTION, POWDER, FOR SOLUTION INTRAVENOUS at 07:28

## 2021-08-13 RX ADMIN — PROPOFOL 100 MG: 10 INJECTION, EMULSION INTRAVENOUS at 07:37

## 2021-08-13 RX ADMIN — PROPOFOL 80 MG: 10 INJECTION, EMULSION INTRAVENOUS at 07:29

## 2021-08-13 RX ADMIN — TRAMADOL HYDROCHLORIDE 50 MG: 50 TABLET, FILM COATED ORAL at 08:44

## 2021-08-13 RX ADMIN — PROPOFOL 50 MG: 10 INJECTION, EMULSION INTRAVENOUS at 07:35

## 2021-08-13 RX ADMIN — LIDOCAINE HYDROCHLORIDE 60 MG: 20 INJECTION, SOLUTION EPIDURAL; INFILTRATION; INTRACAUDAL; PERINEURAL at 07:29

## 2021-08-13 RX ADMIN — FENTANYL CITRATE 50 MCG: 50 INJECTION INTRAMUSCULAR; INTRAVENOUS at 07:37

## 2021-08-13 RX ADMIN — ONDANSETRON 4 MG: 2 INJECTION INTRAMUSCULAR; INTRAVENOUS at 07:45

## 2021-08-13 RX ADMIN — PROPOFOL 20 MG: 10 INJECTION, EMULSION INTRAVENOUS at 07:31

## 2021-08-13 RX ADMIN — MIDAZOLAM 2 MG: 1 INJECTION INTRAMUSCULAR; INTRAVENOUS at 07:25

## 2021-08-13 RX ADMIN — FENTANYL CITRATE 50 MCG: 50 INJECTION INTRAMUSCULAR; INTRAVENOUS at 07:29

## 2021-08-13 RX ADMIN — SODIUM CHLORIDE: 9 INJECTION, SOLUTION INTRAVENOUS at 06:36

## 2021-08-13 ASSESSMENT — PULMONARY FUNCTION TESTS
PIF_VALUE: 0
PIF_VALUE: 2
PIF_VALUE: 16
PIF_VALUE: 8
PIF_VALUE: 20
PIF_VALUE: 0
PIF_VALUE: 16
PIF_VALUE: 19
PIF_VALUE: 4
PIF_VALUE: 37
PIF_VALUE: 8
PIF_VALUE: 0
PIF_VALUE: 22
PIF_VALUE: 0
PIF_VALUE: 0
PIF_VALUE: 5
PIF_VALUE: 2
PIF_VALUE: 0
PIF_VALUE: 8
PIF_VALUE: 9
PIF_VALUE: 16
PIF_VALUE: 0
PIF_VALUE: 29
PIF_VALUE: 0
PIF_VALUE: 0
PIF_VALUE: 16
PIF_VALUE: 0
PIF_VALUE: 4
PIF_VALUE: 0
PIF_VALUE: 0
PIF_VALUE: 4
PIF_VALUE: 0

## 2021-08-13 ASSESSMENT — PAIN DESCRIPTION - LOCATION: LOCATION: HAND

## 2021-08-13 ASSESSMENT — PAIN DESCRIPTION - ONSET: ONSET: ON-GOING

## 2021-08-13 ASSESSMENT — PAIN SCALES - GENERAL
PAINLEVEL_OUTOF10: 9
PAINLEVEL_OUTOF10: 8

## 2021-08-13 ASSESSMENT — PAIN - FUNCTIONAL ASSESSMENT
PAIN_FUNCTIONAL_ASSESSMENT: 0-10
PAIN_FUNCTIONAL_ASSESSMENT: ACTIVITIES ARE NOT PREVENTED

## 2021-08-13 ASSESSMENT — PAIN DESCRIPTION - PROGRESSION: CLINICAL_PROGRESSION: GRADUALLY WORSENING

## 2021-08-13 ASSESSMENT — PAIN DESCRIPTION - DESCRIPTORS: DESCRIPTORS: NUMBNESS;DISCOMFORT

## 2021-08-13 ASSESSMENT — PAIN DESCRIPTION - ORIENTATION: ORIENTATION: RIGHT

## 2021-08-13 ASSESSMENT — ENCOUNTER SYMPTOMS: SHORTNESS OF BREATH: 0

## 2021-08-13 ASSESSMENT — PAIN DESCRIPTION - FREQUENCY: FREQUENCY: CONTINUOUS

## 2021-08-13 ASSESSMENT — LIFESTYLE VARIABLES: SMOKING_STATUS: 1

## 2021-08-13 ASSESSMENT — PAIN DESCRIPTION - PAIN TYPE: TYPE: SURGICAL PAIN

## 2021-08-13 NOTE — ANESTHESIA POSTPROCEDURE EVALUATION
Department of Anesthesiology  Postprocedure Note    Patient: Kye Jang  MRN: [id number]  [place]: 1975  Date of evaluation: 8/13/2021  Time:  9:21 AM     Procedure Summary     Date: 08/13/21 Room / Location: [address] [place] [address]    Anesthesia Start: 0725 Anesthesia Stop: 0801    Procedure: RIGHT CARPAL TUNNEL RELEASE (Right Hand) Diagnosis:       Carpal tunnel syndrome on left      Carpal tunnel syndrome on right      (RIGHT CARPAL TUNNEL SYNDROME)    Surgeons: Gary Shields MD Responsible Provider: True Vásquez MD    Anesthesia Type: MAC ASA Status: 2          Anesthesia Type: MAC    Андрей Phase I: Андрей Score: 10    Андрей Phase II: Андрей Score: 10    Last vitals: Reviewed and per EMR flowsheets.        Anesthesia Post Evaluation    Patient location during evaluation: PACU  Patient participation: complete - patient participated  Level of consciousness: awake and alert  Airway patency: patent  Nausea & Vomiting: no nausea and no vomiting  Complications: no  Cardiovascular status: hemodynamically stable  Respiratory status: acceptable  Hydration status: stable

## 2021-08-13 NOTE — ANESTHESIA PRE PROCEDURE
[facility] Department of Anesthesiology  Pre-Anesthesia Evaluation/Consultation       Name:  Rito Montez  : 1975  Age:  40 y.o. MRN:  [id number]  Date: 2021           Surgeon: Surgeon(s):  Heather Bourgeois MD    Procedure: Procedure(s):  RIGHT CARPAL TUNNEL RELEASE     Allergies   Allergen Reactions    Vicodin [Hydrocodone-Acetaminophen] Itching and Rash     Patient Active Problem List   Diagnosis    Disc displacement, lumbar    Lumbar facet arthropathy    Opiate analgesic contract exists    Cervical spondylosis without myelopathy    Closed fracture of right distal radius    Bilateral hand pain    Cramping of hands    Current smoker    Carpal tunnel syndrome of left wrist    Carpal tunnel syndrome of right wrist     History reviewed. No pertinent past medical history. Past Surgical History:   Procedure Laterality Date    ABDOMEN SURGERY      exploratory surgery during appy--had colon resection    APPENDECTOMY      CARPAL TUNNEL RELEASE Left 2021    LEFT CARPAL TUNNEL RELEASE performed by Heather Bourgeois MD at [facility] Right 2020    RIGHT DISTAL RADIUS OPEN REDUCTION INTERNAL FIXATION - SABRINA performed by Heather Bourgeois MD at [address] History     Tobacco Use    Smoking status: Current Every Day Smoker     Packs/day: 1.00     Years: 15.00     Pack years: 15.00     Types: Cigarettes    Smokeless tobacco: Never Used   Vaping Use    Vaping Use: Never used   Substance Use Topics    Alcohol use: Not Currently    Drug use: Never     Medications  No current facility-administered medications on file prior to encounter. No current outpatient medications on file prior to encounter.      Current Facility-Administered Medications   Medication Dose Route Frequency Provider Last Rate Last Admin    0.9 % sodium chloride infusion   Intravenous Continuous Nadege Diane  mL/hr at 21 0636 New Bag at 21    sodium chloride flush 0.9 % injection 10 mL  10 mL Intravenous 2 times per day Tashia Guadarrama MD        sodium chloride flush 0.9 % injection 10 mL  10 mL Intravenous PRN Tashia Guadarrama MD        0.9 % sodium chloride infusion  25 mL Intravenous PRN Tashia Guadarrama MD        ceFAZolin (ANCEF) 2000 mg in dextrose 5 % 100 mL IVPB  2,000 mg Intravenous Once Jake Don MD         Vital Signs (Current)   Vitals:    21   BP: (!) 150/87   Pulse: 68   Resp: 16   Temp: 96.6 °F (35.9 °C)   SpO2: 96%     Vital Signs Statistics (for past 48 hrs)     Temp  Av.6 °F (35.9 °C)  Min: 96.6 °F (35.9 °C)   Min taken time: 21  Max: 96.6 °F (35.9 °C)   Max taken time: 21  Pulse  Av  Min: 76   Min taken time: 21  Max: 76   Max taken time: 21  Resp  Av  Min: 12   Min taken time: 21  Max: 12   Max taken time: 21  BP  Min: 150/87   Min taken time: 21  Max: 150/87   Max taken time: 21  SpO2  Av %  Min: 96 %   Min taken time: 21  Max: 96 %   Max taken time: 21    BP Readings from Last 3 Encounters:   21 (!) 150/87   21 (!) 91/51   21 (!) 142/80     BMI  Body mass index is 27.24 kg/m². Estimated body mass index is 27.24 kg/m² as calculated from the following:    Height as of this encounter: 5' 10\" (1.778 m). Weight as of this encounter: 189 lb 13.1 oz (86.1 kg). CBC No results found for: WBC, RBC, HGB, HCT, MCV, RDW, PLT  CMP  No results found for: NA, K, CL, CO2, BUN, CREATININE, GFRAA, AGRATIO, LABGLOM, GLUCOSE, PROT, CALCIUM, BILITOT, ALKPHOS, AST, ALT  BMP  No results found for: NA, K, CL, CO2, BUN, CREATININE, CALCIUM, GFRAA, LABGLOM, GLUCOSE  POCGlucose  No results for input(s): GLUCOSE in the last 72 hours.    Coags  No results found for: PROTIME, INR, APTT  HCG (If Applicable) No results found for: PREGTESTUR, PREGSERUM, HCG, HCGQUANT   ABGs No results found for: PHART, PO2ART, NTK7QDP, QFQ2REE, BEART, E2BPQMOE   Type & Screen (If Applicable)  No results found for: LABABO, LABRH                         BMI: Wt Readings from Last 3 Encounters:       NPO Status:   Date of last liquid consumption: 08/12/21   Time of last liquid consumption: 2000   Date of last solid food consumption: 08/12/21      Time of last solid consumption: 2000       Anesthesia Evaluation  Patient summary reviewed no history of anesthetic complications:   Airway: Mallampati: III  TM distance: >3 FB   Neck ROM: full   Dental:      Comment: No loose teeth    Pulmonary:normal exam    (+) current smoker    (-) COPD, asthma, shortness of breath, recent URI and sleep apnea          Patient smoked on day of surgery. Cardiovascular:Negative CV ROS  Exercise tolerance: good (>4 METS),       (-) hypertension      Rhythm: regular  Rate: normal           Beta Blocker:  Not on Beta Blocker         Neuro/Psych:   (+) neuromuscular disease:,    (-) seizures, TIA, CVA, headaches and psychiatric history           GI/Hepatic/Renal: Neg GI/Hepatic/Renal ROS            Endo/Other: Negative Endo/Other ROS                    Abdominal:             Vascular: negative vascular ROS. Other Findings:               Anesthesia Plan      MAC     ASA 2       Induction: intravenous. MIPS: Postoperative opioids intended and Prophylactic antiemetics administered. Anesthetic plan and risks discussed with patient, spouse and mother. Plan discussed with CRNA. This pre-anesthesia assessment may be used as a history and physical.    DOS STAFF ADDENDUM:    Pt seen and examined, chart reviewed (including anesthesia, drug and allergy history). No interval changes to history and physical examination. Anesthetic plan, risks, benefits, alternatives, and personnel involved discussed with patient. Questions and concerns addressed.   Patient(family) verbalized an understanding and agrees to proceed.       Alexa Sandoval MD  August 13, 2021  6:50 AM

## 2021-08-13 NOTE — PROGRESS NOTES
Patient's wife at bedside. Patient tolerating drink and crackers, pt states pain is 8/10. I informed the patient that I will get oral pain medication per order. Call light within reach, will continue to monitor.

## 2021-08-13 NOTE — H&P
Preoperative H&P Update    The patient's History and Physical in the medical record from [facility] 8/4/2021 was reviewed by me today. History reviewed. No pertinent past medical history. Past Surgical History:   Procedure Laterality Date    ABDOMEN SURGERY      exploratory surgery during appy--had colon resection    APPENDECTOMY      CARPAL TUNNEL RELEASE Left 7/16/2021    LEFT CARPAL TUNNEL RELEASE performed by Dread Flores MD at [facility] Right 2/27/2020    RIGHT DISTAL RADIUS OPEN REDUCTION INTERNAL FIXATION - SABRINA performed by Dread Flores MD at [address] [facility]     No current facility-administered medications on file prior to encounter. No current outpatient medications on file prior to encounter. Allergies   Allergen Reactions    Vicodin [Hydrocodone-Acetaminophen] Itching and Rash      I reviewed the HPI, medications, allergies, reason for surgery, diagnosis and treatment plan and there has been no change. The patient was evaluated by me today. Physical exam findings for this update include:    Vitals:    08/13/21 0619   BP: (!) 150/87   Pulse: 68   Resp: 16   Temp: 96.6 °F (35.9 °C)   SpO2: 96%     Airway is intact  Chest: chest clear, no wheezing, rales, normal symmetric air entry, no tachypnea, retractions or cyanosis  Heart: regular rate and rhythm ; heart sounds normal  Findings on exam of the body region where surgery is to be performed include:  Right carpal tunnel syndrome.     Electronically signed by Dread Flores MD on 8/13/2021 at 6:38 AM

## 2021-08-13 NOTE — PROGRESS NOTES
0759 pt arrived from OR, vitals stable on 4L NC. Right hand dressing clean, dry, and intact- ACE. JESSICA right radial pulse- covered by dressing. Right fingers warm, good cap refill. Pt remains lethargic.

## 2021-08-13 NOTE — PROGRESS NOTES
Dr. Maggie Kearney notified pt having pain, script for tramadol from Dr. Nas Hendrix for home, Dr. Maggie Kearney ordered dose of tramadol for post-op pain.

## 2021-08-14 NOTE — OP NOTE
[address] [facility]                                OPERATIVE REPORT    PATIENT NAME: Dina Padilla                      :        1975  MED REC NO:   [id number]                          ROOM:  ACCOUNT NO:   [de-identified]                           ADMIT DATE: 2021  PROVIDER:     Renee Rhoades MD      DATE OF PROCEDURE:  2021    PREOPERATIVE DIAGNOSIS:  Right wrist carpal tunnel syndrome. POSTOPERATIVE DIAGNOSIS:  Right wrist carpal tunnel syndrome. OPERATION PERFORMED:  Neuroplasty right median nerve at the wrist with  carpal tunnel release. SURGEON:  Renee Rhoades MD    ASSISTANTPaige Ibrahim, Surgical Assistant. ANESTHESIA:  General anesthesia. ESTIMATED BLOOD LOSS:  Minimal.    COMPLICATIONS:  None. TOURNIQUET:  Right forearm, 250 mmHg. INDICATIONS:  This is a 50-year-old male, right-hand dominant, who was  diagnosed with bilateral wrist carpal tunnel syndrome. He had the left  one done about a month ago. He had good improvement, and he elected to  proceed with surgical release of the right side. All risks, benefits  and alternatives were discussed with the patient. He agreed to proceed  with surgical treatment. OPERATIVE PROCEDURE:  The patient's right wrist was marked. He received  2 gm of Ancef IV preoperatively. The patient was then brought to the  operating room, underwent local MAC initially, but because he was  coughing because of smoking, underwent general anesthesia. A  well-padded tourniquet was placed, right upper forearm. The right upper  extremity was then prepped and draped in regular sterile routine  fashion. A time-out was called confirming the patient's name, site and  procedure. Esmarch was used for exsanguination. Tourniquet was inflated to 250  mmHg. A longitudinal incision was made in line of the fourth ray.   That  was taken down sharply incising the deep fascia and then the transverse  metacarpal ligament was identified and was incised with a knife. It was  found to be very thick. We then finished the release with Met scissors  both proximally and distally. We then inspected the carpal tunnel and  there was no mass lesion and the median nerve was free. At this point, we let the tourniquet down and hemostasis was secured. We irrigated the incision copiously with normal saline. We then closed  the skin with 4-0 nylon. Dressing was then applied in the form of  Xeroform, 4x4, sterile Webril and Coban. The patient tolerated the procedure well, was taken to the recovery in  stable condition. POSTOPERATIVE PLAN:  The patient will be discharged home. He can start  immediate range of motion of the finger but nonweightbearing for at  least two to three weeks.         Kahlil Polanco MD    D: 08/13/2021 17:53:55       T: 08/13/2021 22:32:58     [id number]  Job#: [id number]     Doc#: [id number]    CC:

## 2021-08-27 ENCOUNTER — OFFICE VISIT (OUTPATIENT)
Dept: ORTHOPEDIC SURGERY | Age: 46
End: 2021-08-27
Payer: COMMERCIAL

## 2021-08-27 VITALS — BODY MASS INDEX: 27.06 KG/M2 | WEIGHT: 189 LBS | HEIGHT: 70 IN | RESPIRATION RATE: 16 BRPM

## 2021-08-27 DIAGNOSIS — G56.01 CARPAL TUNNEL SYNDROME OF RIGHT WRIST: Primary | ICD-10-CM

## 2021-08-27 DIAGNOSIS — F17.200 CURRENT SMOKER: ICD-10-CM

## 2021-08-27 PROCEDURE — 99024 POSTOP FOLLOW-UP VISIT: CPT | Performed by: ORTHOPAEDIC SURGERY

## 2021-08-27 PROCEDURE — 99406 BEHAV CHNG SMOKING 3-10 MIN: CPT | Performed by: ORTHOPAEDIC SURGERY

## 2021-08-29 NOTE — PROGRESS NOTES
DIAGNOSIS:  Right wrist medial nerve carpal tunnel release. DATE OF SURGERY:  8/13/2021. HISTORY OF PRESENT ILLNESS:  Mr. Mague Alonzo 40 y.o.  male who came in today for 2 weeks postoperative visit. The patient denies any significant pain in the right wrist. He has been doing ROM. Improved numbness and pain. No fever or Chills. PHYSICAL EXAMINATION:  The incision healing well. No signs of any erythema or drainage, minimal swelling. He has no pain with the active or passive range of motion of the right wrist, good ROM. He has intact sensation distally, and he is neurovascularly intact. IMPRESSION:  2 weeks out from right wrist median nerve carpal tunnel release, and doing very well. PLAN: He can go back to normal activity with no heavy impact activities for 6 weeks. The patient will come back for a follow up in 3 months. The patient understands that there is a chance of medial nerve entrapment recurrence even after surgical release. The patient smokes, and we discussed with the patient the risks of smoking on general health and also on bone and soft tissue healing (delay and non-union), and promised to cut down or stop smoking. Smoking: Educated the patient regarding the hazards of smoking and that it harms their body in many ways. It increases the chance of developing heart disease, lung disease, cancer, and other health problems including poor bone and wound healing. The importance of smoking cessation for optimal bone and wound healing was stressed. This was communicated verbally, 5 Minutes.       Tejal Randall MD

## 2022-03-25 ENCOUNTER — TELEPHONE (OUTPATIENT)
Dept: ORTHOPEDIC SURGERY | Age: 47
End: 2022-03-25

## 2022-03-25 ENCOUNTER — OFFICE VISIT (OUTPATIENT)
Dept: ORTHOPEDIC SURGERY | Age: 47
End: 2022-03-25
Payer: COMMERCIAL

## 2022-03-25 VITALS — WEIGHT: 197 LBS | HEIGHT: 70 IN | BODY MASS INDEX: 28.2 KG/M2

## 2022-03-25 DIAGNOSIS — M25.732: Primary | ICD-10-CM

## 2022-03-25 DIAGNOSIS — M79.641 BILATERAL HAND PAIN: ICD-10-CM

## 2022-03-25 DIAGNOSIS — M79.642 BILATERAL HAND PAIN: ICD-10-CM

## 2022-03-25 PROCEDURE — G8484 FLU IMMUNIZE NO ADMIN: HCPCS | Performed by: ORTHOPAEDIC SURGERY

## 2022-03-25 PROCEDURE — 99214 OFFICE O/P EST MOD 30 MIN: CPT | Performed by: ORTHOPAEDIC SURGERY

## 2022-03-25 PROCEDURE — G8419 CALC BMI OUT NRM PARAM NOF/U: HCPCS | Performed by: ORTHOPAEDIC SURGERY

## 2022-03-25 PROCEDURE — G8427 DOCREV CUR MEDS BY ELIG CLIN: HCPCS | Performed by: ORTHOPAEDIC SURGERY

## 2022-03-25 PROCEDURE — 4004F PT TOBACCO SCREEN RCVD TLK: CPT | Performed by: ORTHOPAEDIC SURGERY

## 2022-03-25 NOTE — PROGRESS NOTES
CHIEF COMPLAINT: Left hand pain/ carpal bossing. HISTORY:  Mr. Radha Artis 40 y.o.  male right handed presents today for the first visit for evaluation of a left hand swelling and pain with tender bump on the back of his hand that started worsening 1 year ago. He is complaining of ulnar hand pain and swelling. This is better with elevation and worse with ROM or with touching it. The pain is intermittent, tender and not radiating. No other complaint. He is well known to me for a right wrist CTR on 8/13/2021 with good improvement. He is a smoker. Denies injury or trauma. No past medical history on file.     Past Surgical History:   Procedure Laterality Date    ABDOMEN SURGERY      exploratory surgery during [facility]--had colon resection    APPENDECTOMY      CARPAL TUNNEL RELEASE Left 7/16/2021    LEFT CARPAL TUNNEL RELEASE performed by Glenn Mena MD at [address] [place] [address] Right 8/13/2021    RIGHT CARPAL TUNNEL RELEASE performed by Glenn Mena MD at [address] [place] [address] Right 2/27/2020    RIGHT DISTAL RADIUS OPEN REDUCTION INTERNAL FIXATION - SABRINA performed by Glenn Mena MD at [address] [place] [address] History     Socioeconomic History    Marital status:      Spouse name: Not on file    Number of children: Not on file    Years of education: Not on file    Highest education level: Not on file   Occupational History    Not on file   Tobacco Use    Smoking status: Current Every Day Smoker     Packs/day: 1.00     Years: 15.00     Pack years: 15.00     Types: Cigarettes    Smokeless tobacco: Never Used   Vaping Use    Vaping Use: Never used   Substance and Sexual Activity    Alcohol use: Not Currently    Drug use: Never    Sexual activity: Not Currently   Other Topics Concern    Not on file   Social History Narrative    Not on file     Social Determinants of Health     Financial Resource Strain:     Difficulty of Paying Living Expenses: Not on file   Food Insecurity:     Worried About Running Out of Food in the Last Year: Not on file    Pastor of Food in the Last Year: Not on file   Transportation Needs:     Lack of Transportation (Medical): Not on file    Lack of Transportation (Non-Medical): Not on file   Physical Activity:     Days of Exercise per Week: Not on file    Minutes of Exercise per Session: Not on file   Stress:     Feeling of Stress : Not on file   Social Connections:     Frequency of Communication with Friends and Family: Not on file    Frequency of Social Gatherings with Friends and Family: Not on file    Attends Taoist Services: Not on file    Active Member of [address] [place] [address] [employer] or Organizations: Not on file    Attends Club or Organization Meetings: Not on file    Marital Status: Not on file   Intimate Partner Violence:     Fear of Current or Ex-Partner: Not on file    Emotionally Abused: Not on file    Physically Abused: Not on file    Sexually Abused: Not on file   Housing Stability:     Unable to Pay for Housing in the Last Year: Not on file    Number of [place] in the Last Year: Not on file    Unstable Housing in the Last Year: Not on file       Family History   Problem Relation Age of Onset    No Known Problems Mother    [place] Other Father         agent orange        No current outpatient medications on file prior to visit. No current facility-administered medications on file prior to visit. Pertinent items are noted in HPI  Review of systems reviewed from Patient History Form and available in the patient's chart under the Media tab. No change noted. PHYSICAL EXAMINATION:  Mr. Nikhil Chew is a very pleasant 40 y.o.  male who presents today in no acute distress, awake, alert, and oriented. He is well dressed, nourished and  groomed. Patient with normal affect. Height is  5' 10\" (1.778 m), weight is 197 lb (89.4 kg), Body mass index is 28.27 kg/m². Resting respiratory rate is 16.      Examination of the gait, showed that the patient walks with no limp . Examination of both upper extremities showing a decreased range of motion of the left hand compare to the other side. There is moderate prominent swelling that can be seen, dorsal left hand. He has intact sensation and good radial pulses. He has moderate tenderness on deep palpation over the dorsal carpals of the left hand. Good  strength. IMAGING:  Lisette Florian were reviewed, dated today in office,  3 views of the left hand, and showed dorsal carpal bossing. IMPRESSION: Left hand dorsal carpal bossing. PLAN:   I discussed with Vicente Judge the findings and reviewed the Xray results. I discussed surgical as well as non surgical options. Given the amount of symptoms, he would like to proceed surgical options as he has failed conservative care and is effecting his ADLs. I discussed the risks and benefits of surgery with the patient, including but not limited to infection, bleeding, pain, injury to nerves or blood vessels failure of the surgery and need for additional surgery. All the patient's questions were answered. We discussed an expected post-operative course. He  is understanding of this and wishes to proceed. Plan on surgery in April, he will call to schedule.        Chelita Kirkpatrick MD

## 2022-03-25 NOTE — LETTER
[facility]  Surgery Scheduling Form:      DEMOGRAPHICS:                                                                                                              .    Patient Name:  Radha Everett  Patient :  1975   Patient SS#:      Patient Phone:  [phone] (home)  Alt. Patient Phone:    Patient Address:  [address]  [place] [address]    PCP:  No primary care provider on file. Insurance ID Number:  Payor/Plan Subscr  Sex Relation Sub. Ins. ID Effective Group Num   1. MEDICAL MUTUACIARA LOVE 1975 Female Spouse [id number] 20 [id number]                                   [address]        DIAGNOSIS & PROCEDURE:                                                                                            .    Diagnosis:  M79. 641 Right hand pain, right carpal bossing M25.739  Operation:  Right metacarpal partial excision and carpal bossing [id number]   Location: [place]  Provider:  Mellissa Lo. Rocky Venegas MD      SCHEDULING INFORMATION:                                                                                         .    Requested Date:  22    OR Time:   8:00                     Patient Arrival Time:  6:00  OR Time Required:  30  Minutes  Anesthesia:  General  Equipment: none  Mini C-Arm:  Yes   Standard C-Arm:  No  Status:  Outpatient  PAT Required:  Yes    Comments:  COVID:           Gill Venegas MD     22                   Pre Operative Physician Prophylaxis Orders - SCIP Protocols      Patient: Radha Everett  :    1975    Procedure: 22 Right metacarpal partial excision and carpal bossing    COVID:   HEIGHT:  Ht Readings from Last 1 Encounters:   22 5' 10\" (1.778 m)                      WEIGHT:  Wt Readings from Last 1 Encounters:   22 197 lb (89.4 kg)         History & Physical:  Roman ] By Surgeon  [ ]By PCP  [ Korene Olszewski Report     Pre-Operative Antibiotic Order:     []  ----  No Antibiotic Ordered       [x]  ----  Give the following Antibiotic within 1 hour prior to start time:                                                      Cefazolin 2g IV <119.9kg (264lbs) OR 3g if >120kg ([id number])       or      Clindamycin 900 mg IV (over 1 hour) if patient is allergic to           PENICILLINS or CAPHALOSPORIN       VTE prophylaxis [ ] Thigh hi  TEDS  [ ]  Knee Hi TEDS [ ] Foot Pumps [ ] Compression Devices      Physician Signature:     Date: 3/25/22  Time: 10:54 AM

## 2022-03-31 NOTE — PROGRESS NOTES
[facility] time___0600_________        Surgery time_____0800_______    Take the following medications with a sip of water: Follow your MD/Surgeons pre-procedure instructions regarding your medications     Do not eat or drink anything after 12:00 midnight prior to your surgery. This includes water chewing gum, mints and ice chips. You may brush your teeth and gargle the morning of your surgery, but do not swallow the water     Please see your family doctor/pediatrician for a history and physical and/or concerning medications. Bring any test results/reports from your physicians office. If you are under the care of a heart doctor or specialist doctor, please be aware that you may be asked to them for clearance    You may be asked to stop blood thinners such as Coumadin, Plavix, Fragmin, Lovenox, etc., or any anti-inflammatories such as:  Aspirin, Ibuprofen, Advil, Naproxen prior to your surgery. We also ask that you stop any OTC medications such as fish oil, vitamin E, glucosamine, garlic, Multivitamins, COQ 10, etc.    We ask that you do not smoke 24 hours prior to surgery  We ask that you do not  drink any alcoholic beverages 24 hours prior to surgery     You must make arrangements for a responsible adult to take you home after your surgery. For your safety you will not be allowed to leave alone or drive yourself home. Your surgery will be cancelled if you do not have a ride home. Also for your safety, it is strongly suggested that someone stay with you the first 24 hours after your surgery. A parent or legal guardian must accompany a child scheduled for surgery and plan to stay at the hospital until the child is discharged. Please do not bring other children with you. For your comfort, please wear simple loose fitting clothing to the hospital.  Please do not bring valuables.     Do not wear any make-up or nail polish on your fingers or toes      For your safety, please do not wear any jewelry or body piercing's on the day of surgery. All jewelry must be removed. If you have dentures, they will be removed before going to operating room. For your convenience, we will provide you with a container. If you wear contact lenses or glasses, they will be removed, please bring a case for them. If you have a living will and a durable power of  for healthcare, please bring in a copy. As part of our patient safety program to minimize surgical site infections, we ask you to do the following:    · Please notify your surgeon if you develop any illness between         now and the  day of your surgery. · This includes a cough, cold, fever, sore throat, nausea,         or vomiting, and diarrhea, etc.  ·  Please notify your surgeon if you experience dizziness, shortness         of breath or blurred vision between now and the time of your surgery. Do not shave your operative site 96 hours prior to surgery. For face and neck surgery, men may use an electric razor 48 hours   prior to surgery. You may shower the night before surgery or the morning of   your surgery with an antibacterial soap. You will need to bring a photo ID and insurance card    [facility] has an onsite pharmacy, would you like to utilize our pharmacy     If you will be staying overnight and use a C-pap machine, please bring   your C-pap to hospital     Our goal is to provide you with excellent care, therefore, visitors will be limited to two(2) in the room at a time so that we may focus on providing this care for you. Please contact pre-admission testing if you have any further questions. [facility] phone number:  [phone] PAT fax number:  [phone]  Please note these are generalized instructions for all surgical cases, you may be provided with more specific instructions according to your surgery.     C-Difficile admission screening and protocol:       * Admitted with diarrhea? [] YES    [x]  NO     *Prior history of C-Diff. In last 3 months? [] YES    [x]  NO     *Antibiotic use in the past 6-8 weeks? [x]  NO    []  YES                 If yes, which ANTIBIOTIC AND REASON______     *Prior hospitalization or nursing home in the last month? []  YES    [x]  NO        SAFETY FIRST. .call before you fall

## 2022-04-07 ENCOUNTER — ANESTHESIA EVENT (OUTPATIENT)
Dept: OPERATING ROOM | Age: 47
End: 2022-04-07
Payer: COMMERCIAL

## 2022-04-11 ENCOUNTER — HOSPITAL ENCOUNTER (OUTPATIENT)
Age: 47
Setting detail: OUTPATIENT SURGERY
Discharge: HOME | End: 2022-04-11
Attending: ORTHOPAEDIC SURGERY | Admitting: ORTHOPAEDIC SURGERY
Payer: COMMERCIAL

## 2022-04-11 ENCOUNTER — ANESTHESIA (OUTPATIENT)
Dept: OPERATING ROOM | Age: 47
End: 2022-04-11
Payer: COMMERCIAL

## 2022-04-11 VITALS
OXYGEN SATURATION: 98 % | BODY MASS INDEX: 28.2 KG/M2 | TEMPERATURE: 97.1 F | SYSTOLIC BLOOD PRESSURE: 131 MMHG | HEART RATE: 54 BPM | WEIGHT: 196.98 LBS | DIASTOLIC BLOOD PRESSURE: 81 MMHG | RESPIRATION RATE: 18 BRPM | HEIGHT: 70 IN

## 2022-04-11 VITALS
SYSTOLIC BLOOD PRESSURE: 106 MMHG | TEMPERATURE: 96.8 F | OXYGEN SATURATION: 100 % | DIASTOLIC BLOOD PRESSURE: 64 MMHG | RESPIRATION RATE: 3 BRPM

## 2022-04-11 DIAGNOSIS — M79.641 RIGHT HAND PAIN: ICD-10-CM

## 2022-04-11 DIAGNOSIS — M25.732: Primary | ICD-10-CM

## 2022-04-11 DIAGNOSIS — M25.739: ICD-10-CM

## 2022-04-11 PROCEDURE — 7100000010 HC PHASE II RECOVERY - FIRST 15 MIN: Performed by: ORTHOPAEDIC SURGERY

## 2022-04-11 PROCEDURE — 3700000000 HC ANESTHESIA ATTENDED CARE: Performed by: ORTHOPAEDIC SURGERY

## 2022-04-11 PROCEDURE — 7100000001 HC PACU RECOVERY - ADDTL 15 MIN: Performed by: ORTHOPAEDIC SURGERY

## 2022-04-11 PROCEDURE — 6360000002 HC RX W HCPCS

## 2022-04-11 PROCEDURE — 88304 TISSUE EXAM BY PATHOLOGIST: CPT

## 2022-04-11 PROCEDURE — 2580000003 HC RX 258: Performed by: ORTHOPAEDIC SURGERY

## 2022-04-11 PROCEDURE — 2500000003 HC RX 250 WO HCPCS

## 2022-04-11 PROCEDURE — 3600000002 HC SURGERY LEVEL 2 BASE: Performed by: ORTHOPAEDIC SURGERY

## 2022-04-11 PROCEDURE — 6360000002 HC RX W HCPCS: Performed by: ORTHOPAEDIC SURGERY

## 2022-04-11 PROCEDURE — 6370000000 HC RX 637 (ALT 250 FOR IP): Performed by: ORTHOPAEDIC SURGERY

## 2022-04-11 PROCEDURE — 2500000003 HC RX 250 WO HCPCS: Performed by: ORTHOPAEDIC SURGERY

## 2022-04-11 PROCEDURE — 3600000012 HC SURGERY LEVEL 2 ADDTL 15MIN: Performed by: ORTHOPAEDIC SURGERY

## 2022-04-11 PROCEDURE — 6360000002 HC RX W HCPCS: Performed by: ANESTHESIOLOGY

## 2022-04-11 PROCEDURE — 2580000003 HC RX 258: Performed by: ANESTHESIOLOGY

## 2022-04-11 PROCEDURE — 7100000000 HC PACU RECOVERY - FIRST 15 MIN: Performed by: ORTHOPAEDIC SURGERY

## 2022-04-11 PROCEDURE — 2709999900 HC NON-CHARGEABLE SUPPLY: Performed by: ORTHOPAEDIC SURGERY

## 2022-04-11 PROCEDURE — 3700000001 HC ADD 15 MINUTES (ANESTHESIA): Performed by: ORTHOPAEDIC SURGERY

## 2022-04-11 PROCEDURE — 7100000011 HC PHASE II RECOVERY - ADDTL 15 MIN: Performed by: ORTHOPAEDIC SURGERY

## 2022-04-11 PROCEDURE — 25130 REMOVAL OF WRIST LESION: CPT | Performed by: ORTHOPAEDIC SURGERY

## 2022-04-11 PROCEDURE — 88311 DECALCIFY TISSUE: CPT

## 2022-04-11 PROCEDURE — A4217 STERILE WATER/SALINE, 500 ML: HCPCS | Performed by: ORTHOPAEDIC SURGERY

## 2022-04-11 RX ORDER — FENTANYL CITRATE 50 UG/ML
25 INJECTION, SOLUTION INTRAMUSCULAR; INTRAVENOUS EVERY 5 MIN PRN
Status: COMPLETED | OUTPATIENT
Start: 2022-04-11 | End: 2022-04-11

## 2022-04-11 RX ORDER — SODIUM CHLORIDE 0.9 % (FLUSH) 0.9 %
5-40 SYRINGE (ML) INJECTION EVERY 12 HOURS SCHEDULED
Status: DISCONTINUED | OUTPATIENT
Start: 2022-04-11 | End: 2022-04-11 | Stop reason: HOSPADM

## 2022-04-11 RX ORDER — SODIUM CHLORIDE 0.9 % (FLUSH) 0.9 %
10 SYRINGE (ML) INJECTION PRN
Status: DISCONTINUED | OUTPATIENT
Start: 2022-04-11 | End: 2022-04-11 | Stop reason: HOSPADM

## 2022-04-11 RX ORDER — TRAMADOL HYDROCHLORIDE 50 MG/1
50 TABLET ORAL ONCE
Status: COMPLETED | OUTPATIENT
Start: 2022-04-11 | End: 2022-04-11

## 2022-04-11 RX ORDER — LIDOCAINE HYDROCHLORIDE 20 MG/ML
INJECTION, SOLUTION EPIDURAL; INFILTRATION; INTRACAUDAL; PERINEURAL PRN
Status: DISCONTINUED | OUTPATIENT
Start: 2022-04-11 | End: 2022-04-11 | Stop reason: SDUPTHER

## 2022-04-11 RX ORDER — MIDAZOLAM HYDROCHLORIDE 1 MG/ML
INJECTION INTRAMUSCULAR; INTRAVENOUS PRN
Status: DISCONTINUED | OUTPATIENT
Start: 2022-04-11 | End: 2022-04-11 | Stop reason: SDUPTHER

## 2022-04-11 RX ORDER — MAGNESIUM HYDROXIDE 1200 MG/15ML
LIQUID ORAL CONTINUOUS PRN
Status: COMPLETED | OUTPATIENT
Start: 2022-04-11 | End: 2022-04-11

## 2022-04-11 RX ORDER — DEXAMETHASONE SODIUM PHOSPHATE 4 MG/ML
INJECTION, SOLUTION INTRA-ARTICULAR; INTRALESIONAL; INTRAMUSCULAR; INTRAVENOUS; SOFT TISSUE PRN
Status: DISCONTINUED | OUTPATIENT
Start: 2022-04-11 | End: 2022-04-11 | Stop reason: SDUPTHER

## 2022-04-11 RX ORDER — SODIUM CHLORIDE 9 MG/ML
25 INJECTION, SOLUTION INTRAVENOUS PRN
Status: DISCONTINUED | OUTPATIENT
Start: 2022-04-11 | End: 2022-04-11 | Stop reason: HOSPADM

## 2022-04-11 RX ORDER — SODIUM CHLORIDE 9 MG/ML
INJECTION, SOLUTION INTRAVENOUS PRN
Status: DISCONTINUED | OUTPATIENT
Start: 2022-04-11 | End: 2022-04-11 | Stop reason: HOSPADM

## 2022-04-11 RX ORDER — FENTANYL CITRATE 50 UG/ML
INJECTION, SOLUTION INTRAMUSCULAR; INTRAVENOUS PRN
Status: DISCONTINUED | OUTPATIENT
Start: 2022-04-11 | End: 2022-04-11 | Stop reason: SDUPTHER

## 2022-04-11 RX ORDER — TRAMADOL HYDROCHLORIDE 50 MG/1
50 TABLET ORAL EVERY 6 HOURS PRN
Qty: 12 TABLET | Refills: 0 | Status: SHIPPED | OUTPATIENT
Start: 2022-04-11 | End: 2022-04-14

## 2022-04-11 RX ORDER — CEPHALEXIN 500 MG/1
500 CAPSULE ORAL 4 TIMES DAILY
Qty: 12 CAPSULE | Refills: 0 | Status: SHIPPED | OUTPATIENT
Start: 2022-04-11 | End: 2022-04-14

## 2022-04-11 RX ORDER — DEXMEDETOMIDINE HYDROCHLORIDE 100 UG/ML
INJECTION, SOLUTION INTRAVENOUS PRN
Status: DISCONTINUED | OUTPATIENT
Start: 2022-04-11 | End: 2022-04-11 | Stop reason: SDUPTHER

## 2022-04-11 RX ORDER — SODIUM CHLORIDE 0.9 % (FLUSH) 0.9 %
5-40 SYRINGE (ML) INJECTION PRN
Status: DISCONTINUED | OUTPATIENT
Start: 2022-04-11 | End: 2022-04-11 | Stop reason: HOSPADM

## 2022-04-11 RX ORDER — LORAZEPAM 2 MG/ML
0.5 INJECTION INTRAMUSCULAR
Status: DISCONTINUED | OUTPATIENT
Start: 2022-04-11 | End: 2022-04-11 | Stop reason: HOSPADM

## 2022-04-11 RX ORDER — ONDANSETRON 2 MG/ML
INJECTION INTRAMUSCULAR; INTRAVENOUS PRN
Status: DISCONTINUED | OUTPATIENT
Start: 2022-04-11 | End: 2022-04-11 | Stop reason: SDUPTHER

## 2022-04-11 RX ORDER — SODIUM CHLORIDE 0.9 % (FLUSH) 0.9 %
10 SYRINGE (ML) INJECTION EVERY 12 HOURS SCHEDULED
Status: DISCONTINUED | OUTPATIENT
Start: 2022-04-11 | End: 2022-04-11 | Stop reason: HOSPADM

## 2022-04-11 RX ORDER — BUPIVACAINE HYDROCHLORIDE 5 MG/ML
INJECTION, SOLUTION EPIDURAL; INTRACAUDAL
Status: COMPLETED | OUTPATIENT
Start: 2022-04-11 | End: 2022-04-11

## 2022-04-11 RX ORDER — ONDANSETRON 2 MG/ML
4 INJECTION INTRAMUSCULAR; INTRAVENOUS
Status: DISCONTINUED | OUTPATIENT
Start: 2022-04-11 | End: 2022-04-11 | Stop reason: HOSPADM

## 2022-04-11 RX ORDER — SODIUM CHLORIDE 9 MG/ML
INJECTION, SOLUTION INTRAVENOUS CONTINUOUS
Status: DISCONTINUED | OUTPATIENT
Start: 2022-04-11 | End: 2022-04-11 | Stop reason: HOSPADM

## 2022-04-11 RX ORDER — PROPOFOL 10 MG/ML
INJECTION, EMULSION INTRAVENOUS PRN
Status: DISCONTINUED | OUTPATIENT
Start: 2022-04-11 | End: 2022-04-11 | Stop reason: SDUPTHER

## 2022-04-11 RX ORDER — MEPERIDINE HYDROCHLORIDE 25 MG/ML
12.5 INJECTION INTRAMUSCULAR; INTRAVENOUS; SUBCUTANEOUS EVERY 5 MIN PRN
Status: DISCONTINUED | OUTPATIENT
Start: 2022-04-11 | End: 2022-04-11 | Stop reason: HOSPADM

## 2022-04-11 RX ORDER — DIPHENHYDRAMINE HYDROCHLORIDE 50 MG/ML
12.5 INJECTION INTRAMUSCULAR; INTRAVENOUS
Status: DISCONTINUED | OUTPATIENT
Start: 2022-04-11 | End: 2022-04-11 | Stop reason: HOSPADM

## 2022-04-11 RX ADMIN — DEXMEDETOMIDINE HYDROCHLORIDE 8 MCG: 100 INJECTION, SOLUTION INTRAVENOUS at 07:58

## 2022-04-11 RX ADMIN — DEXMEDETOMIDINE HYDROCHLORIDE 8 MCG: 100 INJECTION, SOLUTION INTRAVENOUS at 08:14

## 2022-04-11 RX ADMIN — MIDAZOLAM 2 MG: 1 INJECTION INTRAMUSCULAR; INTRAVENOUS at 07:45

## 2022-04-11 RX ADMIN — PROPOFOL 50 MG: 10 INJECTION, EMULSION INTRAVENOUS at 07:52

## 2022-04-11 RX ADMIN — DEXMEDETOMIDINE HYDROCHLORIDE 8 MCG: 100 INJECTION, SOLUTION INTRAVENOUS at 08:07

## 2022-04-11 RX ADMIN — ONDANSETRON 4 MG: 2 INJECTION INTRAMUSCULAR; INTRAVENOUS at 07:56

## 2022-04-11 RX ADMIN — FENTANYL CITRATE 50 MCG: 50 INJECTION INTRAMUSCULAR; INTRAVENOUS at 08:02

## 2022-04-11 RX ADMIN — PROPOFOL 50 MG: 10 INJECTION, EMULSION INTRAVENOUS at 07:53

## 2022-04-11 RX ADMIN — DEXMEDETOMIDINE HYDROCHLORIDE 8 MCG: 100 INJECTION, SOLUTION INTRAVENOUS at 07:55

## 2022-04-11 RX ADMIN — TRAMADOL HYDROCHLORIDE 50 MG: 50 TABLET, COATED ORAL at 09:43

## 2022-04-11 RX ADMIN — FENTANYL CITRATE 25 MCG: 50 INJECTION, SOLUTION INTRAMUSCULAR; INTRAVENOUS at 08:52

## 2022-04-11 RX ADMIN — LIDOCAINE HYDROCHLORIDE 100 MG: 20 INJECTION, SOLUTION EPIDURAL; INFILTRATION; INTRACAUDAL; PERINEURAL at 07:51

## 2022-04-11 RX ADMIN — DEXAMETHASONE SODIUM PHOSPHATE 10 MG: 4 INJECTION, SOLUTION INTRAMUSCULAR; INTRAVENOUS at 07:56

## 2022-04-11 RX ADMIN — SODIUM CHLORIDE: 9 INJECTION, SOLUTION INTRAVENOUS at 07:47

## 2022-04-11 RX ADMIN — DEXMEDETOMIDINE HYDROCHLORIDE 8 MCG: 100 INJECTION, SOLUTION INTRAVENOUS at 08:10

## 2022-04-11 RX ADMIN — CEFAZOLIN SODIUM 2000 MG: 10 INJECTION, POWDER, FOR SOLUTION INTRAVENOUS at 07:55

## 2022-04-11 RX ADMIN — PROPOFOL 200 MG: 10 INJECTION, EMULSION INTRAVENOUS at 07:51

## 2022-04-11 RX ADMIN — FENTANYL CITRATE 50 MCG: 50 INJECTION INTRAMUSCULAR; INTRAVENOUS at 07:52

## 2022-04-11 RX ADMIN — FENTANYL CITRATE 25 MCG: 50 INJECTION, SOLUTION INTRAMUSCULAR; INTRAVENOUS at 08:58

## 2022-04-11 ASSESSMENT — PULMONARY FUNCTION TESTS
PIF_VALUE: 2
PIF_VALUE: 20
PIF_VALUE: 2
PIF_VALUE: 8
PIF_VALUE: 0
PIF_VALUE: 2
PIF_VALUE: 8
PIF_VALUE: 9
PIF_VALUE: 17
PIF_VALUE: 2
PIF_VALUE: 2
PIF_VALUE: -1
PIF_VALUE: 8
PIF_VALUE: 8
PIF_VALUE: 2
PIF_VALUE: 8
PIF_VALUE: 0
PIF_VALUE: 8
PIF_VALUE: 2
PIF_VALUE: 8
PIF_VALUE: 8
PIF_VALUE: 0
PIF_VALUE: 8
PIF_VALUE: 24
PIF_VALUE: 8
PIF_VALUE: 14
PIF_VALUE: 2
PIF_VALUE: 2
PIF_VALUE: 8
PIF_VALUE: -1
PIF_VALUE: -1
PIF_VALUE: 15
PIF_VALUE: 9
PIF_VALUE: 2
PIF_VALUE: 8
PIF_VALUE: 8
PIF_VALUE: 1

## 2022-04-11 ASSESSMENT — PAIN DESCRIPTION - PROGRESSION
CLINICAL_PROGRESSION: NOT CHANGED
CLINICAL_PROGRESSION: GRADUALLY IMPROVING
CLINICAL_PROGRESSION: GRADUALLY IMPROVING
CLINICAL_PROGRESSION: GRADUALLY WORSENING

## 2022-04-11 ASSESSMENT — PAIN DESCRIPTION - FREQUENCY
FREQUENCY: CONTINUOUS

## 2022-04-11 ASSESSMENT — PAIN DESCRIPTION - DESCRIPTORS
DESCRIPTORS: SORE
DESCRIPTORS: ACHING
DESCRIPTORS: SORE
DESCRIPTORS: SORE

## 2022-04-11 ASSESSMENT — PAIN DESCRIPTION - PAIN TYPE
TYPE: SURGICAL PAIN

## 2022-04-11 ASSESSMENT — PAIN DESCRIPTION - ORIENTATION
ORIENTATION: LEFT

## 2022-04-11 ASSESSMENT — PAIN - FUNCTIONAL ASSESSMENT
PAIN_FUNCTIONAL_ASSESSMENT: PREVENTS OR INTERFERES SOME ACTIVE ACTIVITIES AND ADLS
PAIN_FUNCTIONAL_ASSESSMENT: 0-10
PAIN_FUNCTIONAL_ASSESSMENT: PREVENTS OR INTERFERES SOME ACTIVE ACTIVITIES AND ADLS
PAIN_FUNCTIONAL_ASSESSMENT: PREVENTS OR INTERFERES SOME ACTIVE ACTIVITIES AND ADLS

## 2022-04-11 ASSESSMENT — PAIN DESCRIPTION - LOCATION
LOCATION: HAND

## 2022-04-11 ASSESSMENT — PAIN DESCRIPTION - ONSET
ONSET: ON-GOING
ONSET: ON-GOING
ONSET: GRADUAL

## 2022-04-11 ASSESSMENT — LIFESTYLE VARIABLES: SMOKING_STATUS: 0

## 2022-04-11 ASSESSMENT — PAIN SCALES - GENERAL
PAINLEVEL_OUTOF10: 6
PAINLEVEL_OUTOF10: 8
PAINLEVEL_OUTOF10: 6
PAINLEVEL_OUTOF10: 7
PAINLEVEL_OUTOF10: 6

## 2022-04-11 ASSESSMENT — ENCOUNTER SYMPTOMS: SHORTNESS OF BREATH: 0

## 2022-04-11 NOTE — H&P
Preoperative H&P Update    The patient's History and Physical in the medical record from 3/25/2022 was reviewed by me today. History reviewed. No pertinent past medical history. Past Surgical History:   Procedure Laterality Date    APPENDECTOMY      CARPAL TUNNEL RELEASE Left 7/16/2021    LEFT CARPAL TUNNEL RELEASE performed by Scarlet Rodriguez MD at [facility] Right 8/13/2021    RIGHT CARPAL TUNNEL RELEASE performed by Scarlet Rodriguez MD at [address]      d/t ruptured appendix--had ostomy bag for about 6 mos then take down of ostomy bag--at age 16   [facility] FOREARM SURGERY Right 2/27/2020    RIGHT DISTAL RADIUS OPEN REDUCTION INTERNAL FIXATION - SABRINA performed by Scarlet Rodriguez MD at [facility]     No current facility-administered medications on file prior to encounter. No current outpatient medications on file prior to encounter. Allergies   Allergen Reactions    Vicodin [Hydrocodone-Acetaminophen] Itching and Rash      I reviewed the HPI, medications, allergies, reason for surgery, diagnosis and treatment plan and there has been no change. The patient was evaluated by me today. Physical exam findings for this update include:    Vitals:    04/11/22 0628   BP: (!) 138/101   Pulse: 60   Resp: 16   Temp: 97.3 °F (36.3 °C)   SpO2: 98%     Airway is intact  Chest: chest clear, no wheezing, rales, normal symmetric air entry, no tachypnea, retractions or cyanosis  Heart: regular rate and rhythm ; heart sounds normal  Findings on exam of the body region where surgery is to be performed include:  Left hand carpal bossing.     Electronically signed by Scarlet Rodriguez MD on 4/11/2022 at 7:03 AM

## 2022-04-11 NOTE — LETTER
Reba Vee accompanied Diamante Ramirez to [facility] AT [place] on 3/25/2022. If you have any questions or concerns, please don't hesitate to call.       Freddy Benedict RN

## 2022-04-11 NOTE — PROGRESS NOTES
Left hand dressing dry and intact. Resp easy unlabored on room air O2 with SAO2 97%. VSS. IV patent. Pain level 5-6 of 10 and tolerable. Patient stable to transfer to ACU for phase II.

## 2022-04-11 NOTE — PROGRESS NOTES
Patient admitted to PACU from OR. Patient opens eyes and oral airway removed. Patient moving extremities to command. Very drowsy and drifts back asleep. VSS. IV patent to right AC. Left hand coban dressing dry and intact and elevated on pillow.

## 2022-04-11 NOTE — ANESTHESIA POSTPROCEDURE EVALUATION
Department of Anesthesiology  Postprocedure Note    Patient: Karla Rose  MRN: [id number]  [place]: 1975  Date of evaluation: 4/11/2022  Time:  11:04 AM     Procedure Summary     Date: 04/11/22 Room / Location: [address]    Anesthesia Start: [address] Anesthesia Stop: 0831    Procedure: LEFT METACARPAL PARTIAL EXCISION AND CARPAL BOSSING (Left Hand) Diagnosis:       Right hand pain      Carpal boss, unspecified laterality      (RIGHT HAND PAIN, RIGHT CARPAL BOSSING)    Surgeons: Anayeli Yost MD Responsible Provider: Zechariah Serrato MD    Anesthesia Type: general ASA Status: 2          Anesthesia Type: general    Андрей Phase I: Андрей Score: 10    Андрей Phase II: Андрей Score: 10    Last vitals: Reviewed and per EMR flowsheets.        Anesthesia Post Evaluation    Patient location during evaluation: PACU  Patient participation: complete - patient participated  Level of consciousness: awake and alert  Pain score: 4  Nausea & Vomiting: no nausea  Complications: no  Cardiovascular status: hemodynamically stable  Respiratory status: acceptable  Hydration status: stable

## 2022-04-11 NOTE — ANESTHESIA PRE PROCEDURE
Department of Anesthesiology  Preprocedure Note       Name:  Enoch Chaudhry   Age:  40 y.o.  :  1975                                          MRN:  [id number]         Date:  2022      Surgeon: Katie Otoole):  Renato Giang MD    Procedure: Procedure(s):  LEFT METACARPAL PARTIAL EXCISION AND CARPAL BOSSING    Medications prior to admission:   Prior to Admission medications    Not on File       Current medications:    Current Facility-Administered Medications   Medication Dose Route Frequency Provider Last Rate Last Admin    ceFAZolin (ANCEF) 2000 mg in dextrose 5 % 100 mL IVPB  2,000 mg IntraVENous Once Renato Giang MD        0.9 % sodium chloride infusion   IntraVENous Continuous Olga Noble MD        sodium chloride flush 0.9 % injection 10 mL  10 mL IntraVENous 2 times per day Olga Noble MD        sodium chloride flush 0.9 % injection 10 mL  10 mL IntraVENous PRN Olga Noble MD        0.9 % sodium chloride infusion  25 mL IntraVENous PRN Olga Noble MD           Allergies: Allergies   Allergen Reactions    Vicodin [Hydrocodone-Acetaminophen] Itching and Rash       Problem List:    Patient Active Problem List   Diagnosis Code    Disc displacement, 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 No pertinent past medical history.     Past Surgical History:        Procedure Laterality Date    APPENDECTOMY      CARPAL TUNNEL RELEASE Left 2021    LEFT CARPAL TUNNEL RELEASE performed by Renato Giang MD at [address] [facility]  [address] Right 2021    RIGHT CARPAL TUNNEL RELEASE performed by Ajay Kumar MD at [address]      d/t ruptured appendix--had ostomy bag for about 6 mos then take down of ostomy bag--at age 16   [facility] FOREARM SURGERY Right 2/27/2020    RIGHT DISTAL RADIUS OPEN REDUCTION INTERNAL FIXATION - SABRINA [facility] History:    Social History     Tobacco Use    Smoking status: Current Every Day Smoker     Packs/day: 1.00     Years: 15.00     Pack years: 15.00     Types: Cigarettes    Smokeless tobacco: Never Used   Substance Use Topics    Alcohol use: Yes     Comment: rare                                Ready to quit: Not Answered  Counseling given: Not Answered      Vital Signs (Current):   Vitals:    03/31/22 0920 04/11/22 0628   BP:  (!) 138/101   Pulse:  60   Resp:  16   Temp:  97.3 °F (36.3 °C)   TempSrc:  Temporal   SpO2:  98%   Weight: 197 lb (89.4 kg) 196 lb 15.7 oz (89.4 kg)   Height: 5' 10\" (1.778 m) 5' 10\" (1.778 m)                                              BP Readings from Last 3 Encounters:   04/11/22 (!) 138/101   08/13/21 116/65   08/13/21 (!) 160/95       NPO Status: Time of last liquid consumption: 2230                        Time of last solid consumption: 2230                        Date of last liquid consumption: 04/10/22                        Date of last solid food consumption: 04/10/22    BMI:   Wt Readings from Last 3 Encounters:   04/11/22 196 lb 15.7 oz (89.4 kg)   03/25/22 197 lb (89.4 kg)   08/27/21 189 lb (85.7 kg)     Body mass index is 28.26 kg/m². CBC: No results found for: WBC, RBC, HGB, HCT, MCV, RDW, PLT    CMP: No results found for: NA, K, CL, CO2, BUN, CREATININE, GFRAA, AGRATIO, LABGLOM, GLUCOSE, GLU, PROT, CALCIUM, BILITOT, ALKPHOS, AST, ALT    POC Tests: No results for input(s): POCGLU, POCNA, POCK, POCCL, POCBUN, POCHEMO, POCHCT in the last 72 hours.     Coags: No results found for: PROTIME, INR, APTT    HCG (If Applicable): No results found for: PREGTESTUR, PREGSERUM, HCG, HCGQUANT     ABGs: No results found for: PHART, PO2ART, DNN5QQX, FVX2MCF, BEART, Z8HIYFPY     Type & Screen (If Applicable):  No results found for: LABABO, LABRH    Drug/Infectious Status (If Applicable):  No results found for: HIV, HEPCAB    COVID-19 Screening (If Applicable):   Lab Results   Component Value Date    COVID19 Not Detected 08/13/2021           Anesthesia Evaluation  Patient summary reviewed no history of anesthetic complications:   Airway: Mallampati: I  TM distance: >3 FB   Neck ROM: full  Mouth opening: > = 3 FB Dental: normal exam         Pulmonary:Negative Pulmonary ROS       (-) shortness of breath and not a current smoker          Patient did not smoke on day of surgery. Cardiovascular:Negative CV ROS        (-) pacemaker, past MI, CABG/stent and  angina       Beta Blocker:  Not on Beta Blocker         Neuro/Psych:   (+) neuromuscular disease:,    (-) seizures and CVA           GI/Hepatic/Renal: Neg GI/Hepatic/Renal ROS       (-) liver disease and no renal disease       Endo/Other: Negative Endo/Other ROS       (-) diabetes mellitus, hypothyroidism, hyperthyroidism               Abdominal:             Vascular: negative vascular ROS. Other Findings:             Anesthesia Plan      general     ASA 2       Induction: intravenous. MIPS: Postoperative opioids intended and Prophylactic antiemetics administered. Anesthetic plan and risks discussed with patient. Plan discussed with CRNA. This pre-anesthesia assessment may be used as a history and physical.    DOS STAFF ADDENDUM:    Pt seen and examined, chart reviewed (including anesthesia, drug and allergy history). No interval changes to history and physical examination. Anesthetic plan, risks, benefits, alternatives, and personnel involved discussed with patient. Patient verbalized an understanding and agrees to proceed.       Mercy Faith MD  April 11, 2022  6:49 AM

## 2022-04-11 NOTE — PROGRESS NOTES
Ambulatory Surgery/Procedure Discharge Note    Vitals:    04/11/22 1002   BP: 131/81   Pulse: 54   Resp: 18   Temp: 97.1 °F (36.2 °C)   SpO2: 98%       In: 1000 [I.V.:900]  Out: 10     Restroom use offered before discharge. Yes    Pain assessment:  present - adequately treated  Pain Level: 6    Discharge instructions given to patient and his wife. Verbalizes understanding. Protective sleeve for showering given. Patient discharged to home/self care.  Patient discharged via wheel chair by transporter to waiting family/S.O.       4/11/2022 10:24 AM

## 2022-04-12 NOTE — OP NOTE
[address] [facility]                                OPERATIVE REPORT    PATIENT NAME: Amanda Burnett                      :        1975  MED REC NO:   [id number]                          ROOM:  ACCOUNT NO:   [de-identified]                           ADMIT DATE: 2022  PROVIDER:     Umu Hart MD      PREOPERATIVE DIAGNOSIS:  Left wrist carpal bone bossing with bony  prominence. POSTOPERATIVE DIAGNOSIS:  Left wrist carpal bone bossing with bony  prominence. OPERATION PERFORMED:  Excision of bone, benign tumor of carpal bones. SURGEON:  Umu Hart MD    ASSISTANT:  Juan Hudson CNP    ANESTHESIA:  General anesthesia. ESTIMATED BLOOD LOSS:  Minimal.    COMPLICATIONS:  None. TOURNIQUET:  Left upper arm at 250 mmHg. SPECIMEN:  Carpal bone bossing for histopathology. INDICATIONS:  This is a 81-year-old male who presented to our office  complaining of dorsal wrist bony prominence, which is symptomatic. He  was diagnosed with carpal bossing. All risks, benefits, and  alternatives were discussed to the patient. He elected to proceed with  the surgical excision. OPERATIVE PROCEDURE:  The patient's left wrist was marked. He received  2 gm of Ancef preoperatively. The patient was then brought to the  operating room, underwent general anesthesia. A well-padded tourniquet  was placed in the left upper arm. The left upper extremity was then  prepped and draped in the regular sterile routine fashion. A time-out  was called confirming the patient's name, site, and procedure. Esmarch was used for exsanguination. Tourniquet was inflated to 250  mmHg. A longitudinal incision was made from the top of the bony  prominence. Careful dissection was performed protecting the extensor  tendons. We then performed a subperiosteal dissection.   We then exposed  the bone prominence that was carpal bossing and then we used the rongeur  to remove all the bony prominence. We then used the rasp to smoothen up  the edges. At this point, there was no bony prominence. The bony  excision/benign tumor of the carpal bone was sent for histopathology. At this point, we let the tourniquet down. Hemostasis was secured. We  irrigated the incision copiously with normal saline. We then closed the  subcu with 3-0 Vicryl and the skin with 4-0 Monocryl. Steri-Strips were  then applied. Dressing was then applied in the form of Xeroform, 4x4,  sterile Webril, and Ace wrap. The patient tolerated the procedure well, was taken to the Recovery in  stable condition. Huan Alcantara CNP was 1st Assist given the nature of the procedure that needed advanced assistance. POSTOPERATIVE PLAN:  The patient will be discharged home. He will be  weightbearing as tolerated but no heavy-impact activities for the next  two to three weeks.         Janace Holter, MD    D: 04/12/2022 6:46:48       T: 04/12/2022 10:16:15     /[email or web address]  Job#: [id number]     Doc#: [id number]    CC:

## 2022-04-29 ENCOUNTER — OFFICE VISIT (OUTPATIENT)
Dept: ORTHOPEDIC SURGERY | Age: 47
End: 2022-04-29
Payer: COMMERCIAL

## 2022-04-29 VITALS — BODY MASS INDEX: 28.06 KG/M2 | WEIGHT: 196 LBS | HEIGHT: 70 IN

## 2022-04-29 DIAGNOSIS — M25.732 CARPAL BOSS OF LEFT WRIST: Primary | ICD-10-CM

## 2022-04-29 PROCEDURE — 99024 POSTOP FOLLOW-UP VISIT: CPT | Performed by: ORTHOPAEDIC SURGERY

## 2022-04-29 NOTE — PROGRESS NOTES
DIAGNOSIS:  Left wrist dorsal carpal bossing excision. DATE OF SURGERY:  4/11/2022. HISTORY OF PRESENT ILLNESS:  Mr. Grover Jefferson 40 y.o.  male who came in today for 2 weeks postoperative visit. The patient denies any significant pain in the left wrist, 2/10. He has been working on ROM. No numbness or tingling sensation. No fever or Chills. PHYSICAL EXAMINATION:  The incision healing well left wrist.  No signs of any erythema or drainage, minimal swelling. He has no pain with the active or passive range of motion of the left wrist, good ROM. He has intact sensation distally, and he is neurovascularly intact. IMAGING:  X-rays were taken in the office today, 3 views of the left wrist, and showed no fracture. No other abnormality. IMPRESSION:  2 weeks out from left wrist dorsal carpal bossing excision, and doing very well. PLAN: He can go back to normal activity with no heavy impact activities for 6 weeks. The patient will come back for a follow up in 3 months. The patient understands that there is a chance of carpal bossing recurrence even after surgical excision.       Dread Flores MD

## 2025-04-29 ENCOUNTER — TELEPHONE (OUTPATIENT)
Dept: NEUROLOGY | Age: 50
End: 2025-04-29

## 2025-04-29 NOTE — TELEPHONE ENCOUNTER
Lore BLAKELY reached out, patient needs a Hospital FU with Dr Ma within 1 month. Please reach out to set patient up.

## 2025-05-01 NOTE — TELEPHONE ENCOUNTER
Left message on voice mail to call office to schedule 1 month Hospital follow up.  Ok to use urgent spot if we need to.

## 2025-05-05 NOTE — TELEPHONE ENCOUNTER
Left message for patient to call office to schedule new patient appt within 1 month per Avelina.  Ok to use urgent follow up spot.

## (undated) DEVICE — MASC TURNOVER KIT: Brand: MEDLINE INDUSTRIES, INC.

## (undated) DEVICE — STRIP,CLOSURE,WOUND,MEDI-STRIP,1/2X4: Brand: MEDLINE

## (undated) DEVICE — SYRINGE MED 10ML LUERLOCK TIP W/O SFTY DISP

## (undated) DEVICE — HYPODERMIC SAFETY NEEDLE: Brand: MAGELLAN

## (undated) DEVICE — APPLICATOR MEDICATED 26 CC SOLUTION HI LT ORNG CHLORAPREP

## (undated) DEVICE — COUNTERSINK FOR SCREWS 2.7,3.5MM: Brand: VARIAX

## (undated) DEVICE — BANDAGE,GAUZE,BULKEE II,4.5"X4.1YD,STRL: Brand: MEDLINE

## (undated) DEVICE — GLOVE SURG SZ 65 L12IN FNGR THK79MIL GRN LTX FREE

## (undated) DEVICE — BANDAGE GZ W2XL75IN ST RAYON POLY CNFRM STRTCH LTWT

## (undated) DEVICE — DRESSING,GAUZE,XEROFORM,CURAD,1"X8",ST: Brand: CURAD

## (undated) DEVICE — DRAPE HND W114XL142IN BLU POLYPR W O PCH FEN CRD AND TB HLDR

## (undated) DEVICE — GLOVE SURG SZ 8 CRM LTX FREE POLYISOPRENE POLYMER BEAD ANTI

## (undated) DEVICE — PADDING CAST W6INXL4YD NONSTERILE COT RAYON MICROPLEATED

## (undated) DEVICE — SUTURE VCRL SZ 3-0 L18IN ABSRB UD L26MM SH 1/2 CIR J864D

## (undated) DEVICE — 3 ML SYRINGE LUER-LOCK TIP: Brand: MONOJECT

## (undated) DEVICE — 3M™ STERI-STRIP™ COMPOUND BENZOIN TINCTURE 40 BAGS/CARTON 4 CARTONS/CASE C1544: Brand: 3M™ STERI-STRIP™

## (undated) DEVICE — Z DISCONTINUED NO SUB IDED -PENCIL ES L10FT W/ HEX LOK BLDE ELECTRD HOLSTER DISP

## (undated) DEVICE — PRECISION THIN (5.5 X 0.38 X 18.0MM)

## (undated) DEVICE — SOLUTION IV IRRIG POUR BRL 0.9% SODIUM CHL 2F7124

## (undated) DEVICE — NEEDLE HYPO 22GA L1 1/2IN PIVOTING SHLD FOR LUERLOCK SYR

## (undated) DEVICE — SPONGE GZ W4XL4IN COT 12 PLY TYP VII WVN C FLD DSGN

## (undated) DEVICE — 3M™ TEGADERM™ TRANSPARENT FILM DRESSING FRAME STYLE, 1624W, 2-3/8 IN X 2-3/4 IN (6 CM X 7 CM), 100/CT 4CT/CASE: Brand: 3M™ TEGADERM™

## (undated) DEVICE — GLOVE SURG SZ 65 THK91MIL LTX FREE SYN POLYISOPRENE

## (undated) DEVICE — GOWN SIRUS NONREIN XL W/TWL: Brand: MEDLINE INDUSTRIES, INC.

## (undated) DEVICE — DRILL, AO, SCALED: Brand: VARIAX

## (undated) DEVICE — SOLUTION IV IRRIG 500ML 0.9% SODIUM CHL 2F7123

## (undated) DEVICE — COTTON UNDERCAST PADDING,CRIMPED FINISH: Brand: WEBRIL

## (undated) DEVICE — LOCKING SCREW, FULLY THREADED,T8
Type: IMPLANTABLE DEVICE | Site: WRIST | Status: NON-FUNCTIONAL
Brand: VARIAX

## (undated) DEVICE — WRAP COHESIVE W2INXL5YD TAN SELF ADH BNDG HND NON STERILE TEAR CARING

## (undated) DEVICE — MASTISOL ADHESIVE LIQ 2/3ML

## (undated) DEVICE — MEDICINE CUP, GRADUATED, STER: Brand: MEDLINE

## (undated) DEVICE — GLOVE ORANGE PI 8   MSG9080

## (undated) DEVICE — BANDAGE COMPR W4INXL12FT E DISP ESMARCH EVEN

## (undated) DEVICE — 3M™ STERI-STRIP™ REINFORCED ADHESIVE SKIN CLOSURES, R1547, 1/2 IN X 4 IN (12 MM X 100 MM), 6 STRIPS/ENVELOPE: Brand: 3M™ STERI-STRIP™

## (undated) DEVICE — NEEDLE HYPO 23GA L1.5IN TURQ POLYPR HUB S STL THN WALL IM

## (undated) DEVICE — ZIMMER® STERILE DISPOSABLE TOURNIQUET CUFF WITH PLC, DUAL PORT, SINGLE BLADDER, 18 IN. (46 CM)

## (undated) DEVICE — SYRINGE,EAR/ULCER, 3 OZ, STERILE: Brand: MEDLINE

## (undated) DEVICE — SUTURE NONABSORBABLE MONOFILAMENT 4-0 FS-2 18 IN ETHILON 662H

## (undated) DEVICE — SHEET,DRAPE,53X77,STERILE: Brand: MEDLINE

## (undated) DEVICE — ELECTRODE PT RET AD L9FT HI MOIST COND ADH HYDRGEL CORDED

## (undated) DEVICE — BANDAGE ELASTIC 5YDX4IN ST COMPRSS LF NON ADH

## (undated) DEVICE — MINOR SET UP PK

## (undated) DEVICE — BANDAGE COMPR W6INXL12FT SMOOTH FOR LIMB EXSANG ESMARCH

## (undated) DEVICE — BLADE ES ELASTOMERIC COAT INSUL DURABLE BEND UPTO 90DEG

## (undated) DEVICE — COVER LT HNDL BLU PLAS

## (undated) DEVICE — DRAPE CARM MINI FOR IMAG SYS INSIGHT FLROSCN

## (undated) DEVICE — SUTURE VCRL + SZ 3-0 L18IN ABSRB UD SH 1/2 CIR TAPERCUT NDL VCP864D

## (undated) DEVICE — 3M™ COBAN™ NL STERILE NON-LATEX SELF-ADHERENT WRAP, 2083S, 3 IN X 5 YD (7,5 CM X 4,5 M), 24 ROLLS/CASE: Brand: 3M™ COBAN™

## (undated) DEVICE — TUBING, SUCTION, 1/4" X 12', STRAIGHT: Brand: MEDLINE

## (undated) DEVICE — Z DISCONTINUED USE 2275686 GLOVE SURG SZ 8 L12IN FNGR THK13MIL WHT ISOLEX POLYISOPRENE

## (undated) DEVICE — GAUZE,SPONGE,4"X4",8PLY,STRL,LF,10/TRAY: Brand: MEDLINE

## (undated) DEVICE — BLADE CLIPPER GEN PURP NS

## (undated) DEVICE — TOWEL,OR,DSP,ST,BLUE,STD,4/PK,20PK/CS: Brand: MEDLINE

## (undated) DEVICE — SUTURE MCRYL SZ 4-0 L27IN ABSRB UD L19MM PS-2 1/2 CIR PRIM Y426H

## (undated) DEVICE — SUTURE MCRYL + SZ 4-0 L18IN ABSRB UD L19MM PS-2 3/8 CIR MCP496G

## (undated) DEVICE — GLOVE SURG SZ 6 THK91MIL LTX FREE SYN POLYISOPRENE ANTI

## (undated) DEVICE — HAND AND ELBOW: Brand: MEDLINE INDUSTRIES, INC.

## (undated) DEVICE — SYRINGE IRRIG 60ML SFT PLIABLE BLB EZ TO GRP 1 HND USE W/

## (undated) DEVICE — PACK PROCEDURE SURG EXTREMITY MFFOP CUST

## (undated) DEVICE — KIRSCHNER WIRE
Type: IMPLANTABLE DEVICE | Site: WRIST | Status: NON-FUNCTIONAL
Brand: VARIAX
Removed: 2020-02-27

## (undated) DEVICE — PADDING CAST W4INXL4YD ST COT RAYON MICROPLEATED HIGHLY

## (undated) DEVICE — CHLORAPREP 26ML ORANGE

## (undated) DEVICE — INTENDED FOR TISSUE SEPARATION, AND OTHER PROCEDURES THAT REQUIRE A SHARP SURGICAL BLADE TO PUNCTURE OR CUT.: Brand: BARD-PARKER ® STAINLESS STEEL BLADES